# Patient Record
Sex: MALE | Race: WHITE | NOT HISPANIC OR LATINO | Employment: OTHER | URBAN - METROPOLITAN AREA
[De-identification: names, ages, dates, MRNs, and addresses within clinical notes are randomized per-mention and may not be internally consistent; named-entity substitution may affect disease eponyms.]

---

## 2023-08-09 ENCOUNTER — OFFICE VISIT (OUTPATIENT)
Dept: FAMILY MEDICINE | Facility: CLINIC | Age: 43
End: 2023-08-09
Payer: MEDICARE

## 2023-08-09 VITALS
HEART RATE: 91 BPM | DIASTOLIC BLOOD PRESSURE: 80 MMHG | OXYGEN SATURATION: 97 % | SYSTOLIC BLOOD PRESSURE: 126 MMHG | HEIGHT: 66 IN | BODY MASS INDEX: 25.26 KG/M2 | WEIGHT: 157.19 LBS

## 2023-08-09 DIAGNOSIS — Z00.00 PREVENTATIVE HEALTH CARE: ICD-10-CM

## 2023-08-09 DIAGNOSIS — E78.5 HYPERLIPEMIA: ICD-10-CM

## 2023-08-09 DIAGNOSIS — Z11.59 ENCOUNTER FOR HEPATITIS C SCREENING TEST FOR LOW RISK PATIENT: ICD-10-CM

## 2023-08-09 DIAGNOSIS — Z13.31 POSITIVE DEPRESSION SCREENING: ICD-10-CM

## 2023-08-09 DIAGNOSIS — R73.9 ELEVATED BLOOD SUGAR: ICD-10-CM

## 2023-08-09 DIAGNOSIS — Z11.3 SCREEN FOR STD (SEXUALLY TRANSMITTED DISEASE): ICD-10-CM

## 2023-08-09 DIAGNOSIS — Z13.220 ENCOUNTER FOR SCREENING FOR LIPID DISORDER: ICD-10-CM

## 2023-08-09 DIAGNOSIS — Z79.899 DRUG THERAPY: ICD-10-CM

## 2023-08-09 DIAGNOSIS — F41.9 ANXIETY DISORDER, UNSPECIFIED TYPE: ICD-10-CM

## 2023-08-09 DIAGNOSIS — R53.83 FATIGUE, UNSPECIFIED TYPE: ICD-10-CM

## 2023-08-09 DIAGNOSIS — F32.4 MAJOR DEPRESSIVE DISORDER WITH SINGLE EPISODE, IN PARTIAL REMISSION: Primary | ICD-10-CM

## 2023-08-09 PROBLEM — F33.9 DEPRESSION, RECURRENT: Status: ACTIVE | Noted: 2023-08-09

## 2023-08-09 PROCEDURE — 99204 PR OFFICE/OUTPT VISIT, NEW, LEVL IV, 45-59 MIN: ICD-10-PCS | Mod: S$PBB,,, | Performed by: INTERNAL MEDICINE

## 2023-08-09 PROCEDURE — 99999 PR PBB SHADOW E&M-NEW PATIENT-LVL III: ICD-10-PCS | Mod: PBBFAC,,, | Performed by: INTERNAL MEDICINE

## 2023-08-09 PROCEDURE — 99999 PR PBB SHADOW E&M-NEW PATIENT-LVL III: CPT | Mod: PBBFAC,,, | Performed by: INTERNAL MEDICINE

## 2023-08-09 PROCEDURE — 99204 OFFICE O/P NEW MOD 45 MIN: CPT | Mod: S$PBB,,, | Performed by: INTERNAL MEDICINE

## 2023-08-09 PROCEDURE — 99203 OFFICE O/P NEW LOW 30 MIN: CPT | Mod: PBBFAC,PN | Performed by: INTERNAL MEDICINE

## 2023-08-09 RX ORDER — TRAZODONE HYDROCHLORIDE 150 MG/1
150 TABLET ORAL NIGHTLY
Qty: 30 TABLET | Refills: 2 | Status: SHIPPED | OUTPATIENT
Start: 2023-08-09 | End: 2023-09-06 | Stop reason: SDUPTHER

## 2023-08-09 RX ORDER — TRAZODONE HYDROCHLORIDE 150 MG/1
150 TABLET ORAL NIGHTLY
COMMUNITY
End: 2023-08-09 | Stop reason: SDUPTHER

## 2023-08-09 RX ORDER — HYDROXYZINE HYDROCHLORIDE 50 MG/1
50 TABLET, FILM COATED ORAL 3 TIMES DAILY PRN
Qty: 30 TABLET | Refills: 2 | Status: SHIPPED | OUTPATIENT
Start: 2023-08-09 | End: 2023-09-06 | Stop reason: DRUGHIGH

## 2023-08-09 RX ORDER — HYDROXYZINE HYDROCHLORIDE 50 MG/1
50 TABLET, FILM COATED ORAL 4 TIMES DAILY
COMMUNITY
End: 2023-08-09 | Stop reason: SDUPTHER

## 2023-08-09 NOTE — PROGRESS NOTES
Subjective:       Patient ID: Yovany Alcaraz is a 42 y.o. male.    Chief Complaint: Establish Care, Anxiety, Depression, Urinary Retention (Swelling in prostate dx previously), and Discuss possible RENE hearing loss      New patient, legally blind , h/o depression, anxiety who presents to establish care    Anxiety  Presents for initial visit. Onset was 1 to 6 months ago. The problem has been gradually worsening. Symptoms include decreased concentration, depressed mood, excessive worry, irritability, malaise, nervous/anxious behavior and restlessness. Patient reports no palpitations, panic, shortness of breath or suicidal ideas. Symptoms occur most days. The severity of symptoms is moderate. Exacerbated by: his medical cond. The quality of sleep is fair.     Risk factors: medical condition,blindness. His past medical history is significant for anxiety/panic attacks and depression. There is no history of suicide attempts. Past treatments include SSRIs and non-SSRI antidepressants. The treatment provided mild relief. Compliance with prior treatments has been variable. Prior compliance problems include medical issues and insurance issues.   DepressionPatient presents with the following symptoms: decreased concentration, depressed mood, excessive worry, irritability, malaise, nervousness/anxiety and restlessness.  Patient is not experiencing: palpitations, panic, shortness of breath and suicidal ideas.    Patient has a history of: anxiety/panic attacks      Review of Systems   Constitutional:  Positive for irritability. Negative for activity change, appetite change and fever.   Respiratory: Negative.  Negative for shortness of breath.    Cardiovascular: Negative.  Negative for palpitations.   Gastrointestinal: Negative.    Musculoskeletal: Negative.    Psychiatric/Behavioral:  Positive for decreased concentration and depression. Negative for suicidal ideas. The patient is nervous/anxious.          Objective:      Physical  Exam  Vitals and nursing note reviewed.   Constitutional:       Appearance: Normal appearance.   Eyes:      Comments: Blind   Cardiovascular:      Rate and Rhythm: Normal rate and regular rhythm.      Pulses: Normal pulses.      Heart sounds: Normal heart sounds. No murmur heard.     No friction rub. No gallop.   Pulmonary:      Effort: Pulmonary effort is normal.      Breath sounds: Normal breath sounds. No wheezing.   Skin:     General: Skin is warm and dry.   Neurological:      Mental Status: He is alert.         Assessment:       1. Depression, recurrent  Overview:  Moderate MDD  No SI,HI    Assessment & Plan:  Add trazodone for dep, sleep  T/c ref to [facility] in [place]  Monitor closely    Orders:  -     TSH; Future; Expected date: 08/09/2023    2. Encounter for hepatitis C screening test for low risk patient  -     Hepatitis C Antibody; Future; Expected date: 08/09/2023    3. Screen for STD (sexually transmitted disease)  -     HIV 1/2 Ag/Ab (4th Gen); Future; Expected date: 02/09/2024    4. Encounter for screening for lipid disorder  -     Lipid Panel; Future; Expected date: 08/09/2023    5. Elevated blood sugar  -     Hemoglobin A1C; Standing    6. Drug therapy  -     Comprehensive Metabolic Panel; Future; Expected date: 08/09/2023    7. Fatigue, unspecified type    8. Hyperlipemia  -     Lipid Panel; Future; Expected date: 08/09/2023    9. Positive depression screening  Comments:  I have reviewed the positive depression score which warrants active treatment with psychotherapy and/or medications.    10. Anxiety  Overview:  With no SI,HI    Assessment & Plan:  D/w Mom , c/g  Add vistaril      11. Preventative health care  Overview:  See below    Assessment & Plan:  Get lipids, BS , A1c  Get hep C, HIV      Other orders  -     traZODone (DESYREL) 150 MG tablet; Take 1 tablet (150 mg total) by mouth every evening.  Dispense: 30 tablet; Refill: 2  -     hydrOXYzine (ATARAX) 50 MG tablet; Take 1 tablet  (50 mg total) by mouth 3 (three) times daily as needed for Anxiety.  Dispense: 30 tablet; Refill: 2             Plan:       1. Depression, recurrent  Overview:  Moderate MDD  No SI,HI    Assessment & Plan:  Add trazodone for dep, sleep  [facility] [place]  Monitor closely    Orders:  -     TSH; Future; Expected date: 08/09/2023    2. Encounter for hepatitis C screening test for low risk patient  -     Hepatitis C Antibody; Future; Expected date: 08/09/2023    3. Screen for STD (sexually transmitted disease)  -     HIV 1/2 Ag/Ab (4th Gen); Future; Expected date: 02/09/2024    4. Encounter for screening for lipid disorder  -     Lipid Panel; Future; Expected date: 08/09/2023    5. Elevated blood sugar  -     Hemoglobin A1C; Standing    6. Drug therapy  -     Comprehensive Metabolic Panel; Future; Expected date: 08/09/2023    7. Fatigue, unspecified type    8. Hyperlipemia  -     Lipid Panel; Future; Expected date: 08/09/2023    9. Positive depression screening  Comments:  I have reviewed the positive depression score which warrants active treatment with psychotherapy and/or medications.    10. Anxiety  Overview:  With no SI,HI    Assessment & Plan:  D/w Mom , c/g  Add vistaril      11. Preventative health care  Overview:  See below    Assessment & Plan:  Get lipids, BS , A1c  Get hep C, HIV      Other orders  -     traZODone (DESYREL) 150 MG tablet; Take 1 tablet (150 mg total) by mouth every evening.  Dispense: 30 tablet; Refill: 2  -     hydrOXYzine (ATARAX) 50 MG tablet; Take 1 tablet (50 mg total) by mouth 3 (three) times daily as needed for Anxiety.  Dispense: 30 tablet; Refill: 2          I have reviewed the positive depression score which warrants active treatment with psychotherapy and/or medications. Yes

## 2023-08-10 ENCOUNTER — CLINICAL SUPPORT (OUTPATIENT)
Dept: FAMILY MEDICINE | Facility: CLINIC | Age: 43
End: 2023-08-10
Payer: MEDICARE

## 2023-08-10 DIAGNOSIS — E78.5 HYPERLIPEMIA: ICD-10-CM

## 2023-08-10 DIAGNOSIS — Z79.899 DRUG THERAPY: ICD-10-CM

## 2023-08-10 DIAGNOSIS — F32.4 MAJOR DEPRESSIVE DISORDER WITH SINGLE EPISODE, IN PARTIAL REMISSION: ICD-10-CM

## 2023-08-10 DIAGNOSIS — Z13.220 ENCOUNTER FOR SCREENING FOR LIPID DISORDER: ICD-10-CM

## 2023-08-10 DIAGNOSIS — Z11.59 ENCOUNTER FOR HEPATITIS C SCREENING TEST FOR LOW RISK PATIENT: ICD-10-CM

## 2023-08-10 DIAGNOSIS — Z11.3 SCREEN FOR STD (SEXUALLY TRANSMITTED DISEASE): ICD-10-CM

## 2023-08-10 DIAGNOSIS — R73.9 ELEVATED BLOOD SUGAR: ICD-10-CM

## 2023-08-10 LAB
ALBUMIN SERPL BCP-MCNC: 4.4 G/DL (ref 3.5–5.2)
ALP SERPL-CCNC: 94 U/L (ref 55–135)
ALT SERPL W/O P-5'-P-CCNC: 27 U/L (ref 10–44)
ANION GAP SERPL CALC-SCNC: 13 MMOL/L (ref 8–16)
AST SERPL-CCNC: 20 U/L (ref 10–40)
BILIRUB SERPL-MCNC: 0.7 MG/DL (ref 0.1–1)
BUN SERPL-MCNC: 20 MG/DL (ref 6–20)
CALCIUM SERPL-MCNC: 10.2 MG/DL (ref 8.7–10.5)
CHLORIDE SERPL-SCNC: 106 MMOL/L (ref 95–110)
CHOLEST SERPL-MCNC: 271 MG/DL (ref 120–199)
CHOLEST/HDLC SERPL: 6.8 {RATIO} (ref 2–5)
CO2 SERPL-SCNC: 23 MMOL/L (ref 23–29)
CREAT SERPL-MCNC: 1.5 MG/DL (ref 0.5–1.4)
EST. GFR  (NO RACE VARIABLE): 58.9 ML/MIN/1.73 M^2
ESTIMATED AVG GLUCOSE: 97 MG/DL (ref 68–131)
GLUCOSE SERPL-MCNC: 89 MG/DL (ref 70–110)
HBA1C MFR BLD: 5 % (ref 4–5.6)
HDLC SERPL-MCNC: 40 MG/DL (ref 40–75)
HDLC SERPL: 14.8 % (ref 20–50)
LDLC SERPL CALC-MCNC: 171.2 MG/DL (ref 63–159)
NONHDLC SERPL-MCNC: 231 MG/DL
POTASSIUM SERPL-SCNC: 4.3 MMOL/L (ref 3.5–5.1)
PROT SERPL-MCNC: 7.8 G/DL (ref 6–8.4)
SODIUM SERPL-SCNC: 142 MMOL/L (ref 136–145)
TRIGL SERPL-MCNC: 299 MG/DL (ref 30–150)
TSH SERPL DL<=0.005 MIU/L-ACNC: 1.76 UIU/ML (ref 0.4–4)

## 2023-08-10 PROCEDURE — 87389 HIV-1 AG W/HIV-1&-2 AB AG IA: CPT | Performed by: INTERNAL MEDICINE

## 2023-08-10 PROCEDURE — 86803 HEPATITIS C AB TEST: CPT | Performed by: INTERNAL MEDICINE

## 2023-08-10 PROCEDURE — 83036 HEMOGLOBIN GLYCOSYLATED A1C: CPT | Performed by: INTERNAL MEDICINE

## 2023-08-10 PROCEDURE — 80053 COMPREHEN METABOLIC PANEL: CPT | Performed by: INTERNAL MEDICINE

## 2023-08-10 PROCEDURE — 80061 LIPID PANEL: CPT | Performed by: INTERNAL MEDICINE

## 2023-08-10 PROCEDURE — 84443 ASSAY THYROID STIM HORMONE: CPT | Performed by: INTERNAL MEDICINE

## 2023-08-11 LAB
HCV AB SERPL QL IA: NORMAL
HIV 1+2 AB+HIV1 P24 AG SERPL QL IA: NORMAL

## 2023-09-06 ENCOUNTER — OFFICE VISIT (OUTPATIENT)
Dept: FAMILY MEDICINE | Facility: CLINIC | Age: 43
End: 2023-09-06
Payer: MEDICARE

## 2023-09-06 VITALS
DIASTOLIC BLOOD PRESSURE: 70 MMHG | SYSTOLIC BLOOD PRESSURE: 110 MMHG | HEIGHT: 66 IN | BODY MASS INDEX: 25.55 KG/M2 | WEIGHT: 159 LBS

## 2023-09-06 DIAGNOSIS — E78.2 MIXED HYPERLIPIDEMIA: ICD-10-CM

## 2023-09-06 DIAGNOSIS — F33.9 DEPRESSION, RECURRENT: ICD-10-CM

## 2023-09-06 DIAGNOSIS — B02.9 HERPES ZOSTER WITHOUT COMPLICATION: ICD-10-CM

## 2023-09-06 DIAGNOSIS — N18.31 CHRONIC KIDNEY DISEASE (CKD) STAGE G3A/A1, MODERATELY DECREASED GLOMERULAR FILTRATION RATE (GFR) BETWEEN 45-59 ML/MIN/1.73 SQUARE METER AND ALBUMINURIA CREATININE RATIO LESS THAN 30 MG/G: ICD-10-CM

## 2023-09-06 DIAGNOSIS — F41.9 ANXIETY: ICD-10-CM

## 2023-09-06 DIAGNOSIS — N60.09 SOLITARY CYST OF BREAST, UNSPECIFIED LATERALITY: Primary | ICD-10-CM

## 2023-09-06 DIAGNOSIS — L98.9 SKIN LESION: ICD-10-CM

## 2023-09-06 PROBLEM — R21 RASH OF BACK: Status: ACTIVE | Noted: 2023-09-06

## 2023-09-06 PROCEDURE — 99214 OFFICE O/P EST MOD 30 MIN: CPT | Mod: S$GLB,,, | Performed by: INTERNAL MEDICINE

## 2023-09-06 PROCEDURE — 99214 PR OFFICE/OUTPT VISIT, EST, LEVL IV, 30-39 MIN: ICD-10-PCS | Mod: S$GLB,,, | Performed by: INTERNAL MEDICINE

## 2023-09-06 RX ORDER — VALACYCLOVIR HYDROCHLORIDE 1 G/1
1000 TABLET, FILM COATED ORAL 3 TIMES DAILY
Qty: 21 TABLET | Refills: 0 | Status: SHIPPED | OUTPATIENT
Start: 2023-09-06 | End: 2023-11-07 | Stop reason: ALTCHOICE

## 2023-09-06 RX ORDER — TRAZODONE HYDROCHLORIDE 150 MG/1
150 TABLET ORAL NIGHTLY
Qty: 30 TABLET | Refills: 2 | Status: SHIPPED | OUTPATIENT
Start: 2023-09-06 | End: 2023-09-25

## 2023-09-06 RX ORDER — HYDROXYZINE PAMOATE 25 MG/1
25 CAPSULE ORAL EVERY 8 HOURS PRN
Qty: 90 CAPSULE | Refills: 2 | Status: SHIPPED | OUTPATIENT
Start: 2023-09-06 | End: 2023-11-07 | Stop reason: SDUPTHER

## 2023-09-06 RX ORDER — VENLAFAXINE HYDROCHLORIDE 37.5 MG/1
37.5 CAPSULE, EXTENDED RELEASE ORAL DAILY
Qty: 30 CAPSULE | Refills: 2 | Status: SHIPPED | OUTPATIENT
Start: 2023-09-06 | End: 2023-11-07 | Stop reason: DRUGHIGH

## 2023-09-06 NOTE — PROGRESS NOTES
Subjective:       Patient ID: Yovany Alcaraz is a 42 y.o. male.    Chief Complaint: Results, Shoulder Pain (X  2 months after working  out. ), Abscess (Of  tailbone. ), and Mole (Chin.)      Patient is established already .......... presents today for a f/u visit , to discuss labs results and for management of the chronic conditions.        Shoulder Pain     Abscess  Mole  Associated symptoms include a rash.   Hyperlipidemia  This is a new diagnosis. The problem is uncontrolled. Recent lipid tests were reviewed and are high. Factors aggravating his hyperlipidemia include fatty foods. He is currently on no antihyperlipidemic treatment. Risk factors for coronary artery disease include dyslipidemia, family history, male sex and a sedentary lifestyle.   Rash  This is a new problem. The current episode started 1 to 4 weeks ago. The problem is unchanged. The affected locations include the back. The rash is characterized by itchiness and redness. He was exposed to nothing. Past treatments include nothing.     Review of Systems   Unable to perform ROS  Integumentary:  Positive for rash and mole/lesion.   Psychiatric/Behavioral:  Positive for dysphoric mood and sleep disturbance. The patient is nervous/anxious.          Objective:      Physical Exam  Vitals and nursing note reviewed.   Constitutional:       Appearance: Normal appearance.   Cardiovascular:      Rate and Rhythm: Normal rate and regular rhythm.      Pulses: Normal pulses.      Heart sounds: Normal heart sounds. No murmur heard.     No friction rub. No gallop.   Pulmonary:      Effort: Pulmonary effort is normal.      Breath sounds: Normal breath sounds. No wheezing.   Musculoskeletal:         General: Normal range of motion.   Skin:     General: Skin is warm and dry.      Comments: There is a hyperkeratotic lesion of the right part of his chin  There is also a wart like lesion of about half a cm in diameter on the dorsum of his left foot  And this cluster of  erythematous maculopapular lesion on the left side of his lower back   Neurological:      General: No focal deficit present.      Mental Status: He is alert and oriented to person, place, and time.   Psychiatric:         Mood and Affect: Mood normal.         Assessment:       1. Solitary cyst of breast, unspecified laterality    2. Skin lesion  Overview:  With growth like lesion over his right shin and wart like lesion of the dorsum of his left foot    Assessment & Plan:  Epidermal cyst  Skin wart  Referral to Dermatology in [place]    Orders:  -     Ambulatory referral/consult to Dermatology; Future; Expected date: 09/13/2023    3. Chronic kidney disease (CKD) stage G3a/A1, moderately decreased glomerular filtration rate (GFR) between 45-59 mL/min/1.73 square meter and albuminuria creatinine ratio less than 30 mg/g  -     Basic Metabolic Panel; Future; Expected date: 09/06/2023    4. Mixed hyperlipidemia  -     Lipid Panel; Future; Expected date: 09/06/2023    5. Herpes zoster without complication  Overview:  Of the top of his buttocks    Assessment & Plan:  Add Valtrex  Patient was educated on universal precautions and ways to decrease spread specially itching and touching other parts of his body      6. Depression, recurrent  Overview:  Moderate MDD  No SI,HI    Assessment & Plan:  Restart his effexor  Continue trazodone       7. Anxiety  Overview:  With no SI,HI    Assessment & Plan:  Refill vistaril  Dec dose to 25mg b/o sleepiness      Other orders  -     venlafaxine (EFFEXOR-XR) 37.5 MG 24 hr capsule; Take 1 capsule (37.5 mg total) by mouth once daily.  Dispense: 30 capsule; Refill: 2  -     hydrOXYzine pamoate (VISTARIL) 25 MG Cap; Take 1 capsule (25 mg total) by mouth every 8 (eight) hours as needed (anxiety).  Dispense: 90 capsule; Refill: 2  -     traZODone (DESYREL) 150 MG tablet; Take 1 tablet (150 mg total) by mouth every evening.  Dispense: 30 tablet; Refill: 2             Plan:       1. Solitary  cyst of breast, unspecified laterality    2. Skin lesion  Overview:  With growth like lesion over his right shin and wart like lesion of the dorsum of his left foot    Assessment & Plan:  Epidermal cyst  Skin wart  Referral to Dermatology in [place]    Orders:  -     Ambulatory referral/consult to Dermatology; Future; Expected date: 09/13/2023    3. Chronic kidney disease (CKD) stage G3a/A1, moderately decreased glomerular filtration rate (GFR) between 45-59 mL/min/1.73 square meter and albuminuria creatinine ratio less than 30 mg/g  -     Basic Metabolic Panel; Future; Expected date: 09/06/2023    4. Mixed hyperlipidemia  -     Lipid Panel; Future; Expected date: 09/06/2023    5. Herpes zoster without complication  Overview:  Of the top of his buttocks    Assessment & Plan:  Add Valtrex  Patient was educated on universal precautions and ways to decrease spread specially itching and touching other parts of his body      6. Depression, recurrent  Overview:  Moderate MDD  No SI,HI    Assessment & Plan:  Restart his effexor  Continue trazodone       7. Anxiety  Overview:  With no SI,HI    Assessment & Plan:  Refill vistaril  Dec dose to 25mg b/o sleepiness      Other orders  -     venlafaxine (EFFEXOR-XR) 37.5 MG 24 hr capsule; Take 1 capsule (37.5 mg total) by mouth once daily.  Dispense: 30 capsule; Refill: 2  -     hydrOXYzine pamoate (VISTARIL) 25 MG Cap; Take 1 capsule (25 mg total) by mouth every 8 (eight) hours as needed (anxiety).  Dispense: 90 capsule; Refill: 2  -     traZODone (DESYREL) 150 MG tablet; Take 1 tablet (150 mg total) by mouth every evening.  Dispense: 30 tablet; Refill: 2        I spent a total of 33 minutes on the day of the visit.This includes face to face time and non-face to face time preparing to see the patient (eg, review of tests), obtaining and/or reviewing separately obtained history, documenting clinical information in the electronic or other health record, independently interpreting  results and communicating results to the patient/family/caregiver, or care coordinator.

## 2023-09-06 NOTE — ASSESSMENT & PLAN NOTE
I discussed renal condition with patient at length  We discussed diet modification specifically decreasing salt intake, avoiding fast food ,avoiding fried food  We also discussed the importance of minimizing the use of anti-inflammatory agents like Motrin ibuprofen naproxen Aleve etc.  We discussed the importance of increasing fluid intake specifically water and also stay hydrated  We discussed minimizing the use of diuretics as much as clinically possible  We are going to monitor renal functions BUN creatinine urine microalbumin and electrolytes closely  To consider referral to Nephrology Service if renal fx continue to decline  To consider adding an SGL- 2 inhibitor if renal functions continue to decline, regardless of the blood sugar status

## 2023-09-06 NOTE — ASSESSMENT & PLAN NOTE
Add Valtrex  Patient was educated on universal precautions and ways to decrease spread specially itching and touching other parts of his body

## 2023-09-08 ENCOUNTER — TELEPHONE (OUTPATIENT)
Dept: FAMILY MEDICINE | Facility: CLINIC | Age: 43
End: 2023-09-08
Payer: MEDICARE

## 2023-09-08 NOTE — TELEPHONE ENCOUNTER
----- Message from Sheridan Gar sent at 9/8/2023 10:03 AM CDT -----  Contact: Mother Nathalia  Type:  RX Refill Request    Who Called:  Nathalia   Refill or New Rx:  new rx  RX Name and Strength:  venlafaxine (EFFEXOR-XR) 37.5 MG 24 hr capsule  How is the patient currently taking it? (ex. 1XDay):  as directed  Is this a 30 day or 90 day RX:  30  Preferred Pharmacy with phone number:    [facility]/pharmacy #[id number] - [place] [id number] [place] [address] AT [address] [address]  Phone: [phone] Fax: [phone]  Local or Mail Order:  local  Ordering Provider:  Dr Kalin Bosch  Best Call Back Number:  [phone]  Additional Information:  [facility] states they did not rec this script does it need a PA if not please call the pharm because it is showing as rec'd thank you

## 2023-09-08 NOTE — TELEPHONE ENCOUNTER
----- Message from Sheridan Gar sent at 9/8/2023 10:09 AM CDT -----  Contact: MOm Nathalia  When they saw the doctor and he put the pt on effexor XL, the doctor was  going to put him on a 1/2 dose of his traZODone (DESYREL) 150 MG tablet but he accidentally sent it over for a half dose of his hydrOXYzine pamoate (VISTARIL) 25 MG Cap,  which he picked up already and it was for 30 tabs, so he is going to take them 3XDay but will run out early. He will have to have a refill in about 10 days.    You were going to decrease the dosage of his traZodone but not sure of the MG you were going to prescribe for that med.  AT the 150MG trazodone 1 tab at night, the pt was staying too sleepy the next morning.  Please get these scripts straightened out and call theo Sloan back at [phone] to review or if you have questions call mom and send the corrected scripts to the following pharm    [facility]pharm[facility] #[id number] - [place], [place] - [address] [place] [address] AT [place] [address] [place] [id number]  Phone: [phone] Fax: [phone]

## 2023-09-25 RX ORDER — TRAZODONE HYDROCHLORIDE 150 MG/1
150 TABLET ORAL NIGHTLY PRN
Qty: 90 TABLET | Refills: 3 | Status: SHIPPED | OUTPATIENT
Start: 2023-09-25 | End: 2023-11-07 | Stop reason: SDUPTHER

## 2023-11-07 ENCOUNTER — OFFICE VISIT (OUTPATIENT)
Dept: FAMILY MEDICINE | Facility: CLINIC | Age: 43
End: 2023-11-07
Payer: MEDICARE

## 2023-11-07 VITALS
DIASTOLIC BLOOD PRESSURE: 70 MMHG | BODY MASS INDEX: 25.6 KG/M2 | OXYGEN SATURATION: 99 % | HEART RATE: 67 BPM | TEMPERATURE: 99 F | SYSTOLIC BLOOD PRESSURE: 108 MMHG | WEIGHT: 158.63 LBS

## 2023-11-07 DIAGNOSIS — F33.41 RECURRENT MAJOR DEPRESSIVE DISORDER, IN PARTIAL REMISSION: Primary | ICD-10-CM

## 2023-11-07 DIAGNOSIS — G47.00 INSOMNIA, UNSPECIFIED TYPE: ICD-10-CM

## 2023-11-07 DIAGNOSIS — F41.9 ANXIETY: ICD-10-CM

## 2023-11-07 DIAGNOSIS — E78.2 MIXED HYPERLIPIDEMIA: ICD-10-CM

## 2023-11-07 DIAGNOSIS — M94.0 COSTOCHONDRITIS: ICD-10-CM

## 2023-11-07 PROCEDURE — 99214 OFFICE O/P EST MOD 30 MIN: CPT | Mod: S$GLB,,, | Performed by: STUDENT IN AN ORGANIZED HEALTH CARE EDUCATION/TRAINING PROGRAM

## 2023-11-07 PROCEDURE — 99214 PR OFFICE/OUTPT VISIT, EST, LEVL IV, 30-39 MIN: ICD-10-PCS | Mod: S$GLB,,, | Performed by: STUDENT IN AN ORGANIZED HEALTH CARE EDUCATION/TRAINING PROGRAM

## 2023-11-07 RX ORDER — DICLOFENAC SODIUM 10 MG/G
2 GEL TOPICAL 4 TIMES DAILY
Qty: 60 EACH | Refills: 1 | Status: SHIPPED | OUTPATIENT
Start: 2023-11-07 | End: 2024-02-16

## 2023-11-07 RX ORDER — VENLAFAXINE HYDROCHLORIDE 75 MG/1
75 CAPSULE, EXTENDED RELEASE ORAL DAILY
Qty: 30 CAPSULE | Refills: 2 | Status: SHIPPED | OUTPATIENT
Start: 2023-11-07 | End: 2024-01-29 | Stop reason: SDUPTHER

## 2023-11-07 RX ORDER — HYDROXYZINE PAMOATE 25 MG/1
25 CAPSULE ORAL EVERY 8 HOURS PRN
Qty: 90 CAPSULE | Refills: 2 | Status: SHIPPED | OUTPATIENT
Start: 2023-11-07 | End: 2024-02-05

## 2023-11-07 RX ORDER — ROSUVASTATIN CALCIUM 10 MG/1
10 TABLET, COATED ORAL DAILY
Qty: 90 TABLET | Refills: 3 | Status: SHIPPED | OUTPATIENT
Start: 2023-11-07 | End: 2024-01-29 | Stop reason: SDUPTHER

## 2023-11-07 RX ORDER — TRAZODONE HYDROCHLORIDE 150 MG/1
75 TABLET ORAL NIGHTLY PRN
Qty: 30 TABLET | Refills: 3 | Status: SHIPPED | OUTPATIENT
Start: 2023-11-07 | End: 2024-11-06

## 2023-11-07 NOTE — PATIENT INSTRUCTIONS
Zack Pedroza,     If you are due for any health screening(s) below please notify me so we can arrange them to be ordered and scheduled. Most healthy patients at your age complete them, but you are free to accept or refuse.     If you can't do it, I'll definitely understand. If you can, I'd certainly appreciate it!    All of your core healthy metrics are met.      Were here to help you quit smoking     Our records indicated that you are still smoking. One of the best things you can do for your health is to stop smoking and we are here to help.     Talk with your provider about our Smoking Cessation Program and how we can support you on your journey.

## 2023-11-07 NOTE — PROGRESS NOTES
[facility]  Primary Care Clinic - [place], [place]    Family Medicine Office Visit    Subjective     Patient ID: Yovany Alcaraz is a 42 y.o. male who presents to clinic today to follow up.     Medical history, surgical history, medications, allergies, and social history were reviewed and updated.     Chief Complaint: Medication Refill    Medication Refill        Depression/Anxiety  He presented today for follow up with his mom.  He reported that he last saw Dr. Clark in August of 2023 and he had restarted his Effexor 37.5 mg daily.  She reported that he was previously on a higher dose, but had run out for some time.  He was restarted on this lower dose along with hydroxyzine 25 mg 3 times daily.  Being reported compliance with his medication regimen.  Both he and mom feel that he would benefit from a higher dose of Effexor.  He denied suicidal and homicidal ideation.  We will need to send in Effexor 75 mg daily.  Discussed that he can take 2 tablets of his remaining Effexor for now before picking up the next refill.    Insomnia  He has a history of insomnia.  He was started on trazodone 150 mg nightly as needed for sleep.  Mom reported that this dose made him too drowsy and has been cutting the tablets in half.  Reported that the 75 mg dose has been working very well for him.  I offered to send in 100 mg prescription for him, but she prefers to cut the tablets in half as he is stable on this current dose.    Hyperlipidemia  Most recent lipid panel shown below from his last visit with PCP in August.  We discussed that as his ASCVD risk of 8% we would recommend putting him on a statin to reduce his risk of heart attack and stroke.  He and mom are both agreeable to starting this medication.  We will plan to initiate Crestor 10 mg daily.  We did discuss side effects of muscle aches and cramping associated with this medication.    Lab Results   Component Value Date    CHOL 271 (H) 08/10/2023     Lab Results  "  Component Value Date    HDL 40 08/10/2023     Lab Results   Component Value Date    LDLCALC 171.2 (H) 08/10/2023     No results found for: "DLDL"  Lab Results   Component Value Date    TRIG 299 (H) 08/10/2023       f1   Lab Results   Component Value Date    CHOLHDL 14.8 (L) 08/10/2023     The 10-year ASCVD risk score (Christy DELVALLE, et al., 2019) is: 8%    Values used to calculate the score:      Age: 42 years      Sex: Male      Is Non- : No      Diabetic: No      Tobacco smoker: Yes      Systolic Blood Pressure: 108 mmHg      Is BP treated: No      HDL Cholesterol: 40 mg/dL      Total Cholesterol: 271 mg/dL    Rib pain  He reported left shoulder pain.  Upon evaluation of the location of his pain and his primary over his left lower ribs.  Mom reported that his pain is exacerbated whenever he lifts heavy things.  We discussed being careful to reduce injury.  He has been told not to take NSAIDs in the past due to his reduced kidney function.  He does take Tylenol with some improvement.  We discussed initiating Voltaren gel which she was agreeable to.    Vitals:    11/07/23 1237   BP: 108/70   Pulse: 67   Temp: 98.5 °F (36.9 °C)      Wt Readings from Last 3 Encounters:   11/07/23 1237 71.9 kg (158 lb 9.6 oz)   09/06/23 1425 72.1 kg (159 lb)   08/09/23 1336 71.3 kg (157 lb 3 oz)      Review of Systems    Review of Systems otherwise negative unless specified above.        Objective     Physical Exam  Constitutional:       Appearance: Normal appearance.   HENT:      Head: Normocephalic and atraumatic.      Nose: Nose normal.   Cardiovascular:      Rate and Rhythm: Normal rate and regular rhythm.      Heart sounds: No murmur heard.  Pulmonary:      Effort: Pulmonary effort is normal. No respiratory distress.      Breath sounds: Normal breath sounds. No wheezing.   Musculoskeletal:         General: Normal range of motion.   Skin:     General: Skin is warm and dry.   Neurological:      General: No " focal deficit present.      Mental Status: He is alert and oriented to person, place, and time.   Psychiatric:         Mood and Affect: Mood normal.         Behavior: Behavior normal.            Assessment and Plan     Current Outpatient Medications   Medication Instructions    diclofenac sodium (VOLTAREN) 2 g, Topical (Top), 4 times daily    hydrOXYzine pamoate (VISTARIL) 25 mg, Oral, Every 8 hours PRN    rosuvastatin (CRESTOR) 10 mg, Oral, Daily    traZODone (DESYREL) 75 mg, Oral, Nightly PRN    venlafaxine (EFFEXOR XR) 75 mg, Oral, Daily        1. Recurrent major depressive disorder, in partial remission  Overview:  Moderate MDD  No SI,HI    Orders:  -     venlafaxine (EFFEXOR XR) 75 MG 24 hr capsule; Take 1 capsule (75 mg total) by mouth once daily.  Dispense: 30 capsule; Refill: 2    2. Anxiety  Overview:  With no SI,HI    Orders:  -     hydrOXYzine pamoate (VISTARIL) 25 MG Cap; Take 1 capsule (25 mg total) by mouth every 8 (eight) hours as needed (anxiety).  Dispense: 90 capsule; Refill: 2    3. Insomnia, unspecified type  -     traZODone (DESYREL) 150 MG tablet; Take 0.5 tablets (75 mg total) by mouth nightly as needed for Insomnia.  Dispense: 30 tablet; Refill: 3    4. Mixed hyperlipidemia  Overview:  Markedly elevated    Orders:  -     rosuvastatin (CRESTOR) 10 MG tablet; Take 1 tablet (10 mg total) by mouth once daily.  Dispense: 90 tablet; Refill: 3    5. Costochondritis  -     diclofenac sodium (VOLTAREN) 1 % Gel; Apply 2 g topically 4 (four) times daily.  Dispense: 60 each; Refill: 1        We will increase his Effexor to 75 mg daily.  We will continue hydroxyzine and trazodone 75 mg daily.  We will plan to initiate Crestor to improve his cholesterol levels.  We will also start Voltaren gel and continue his Tylenol for his intermittent costochondritis.  We will otherwise plan to have him follow up with PCP in 3 months.         Follow up in about 3 months (around 2/7/2024) for Follow up with   Hiro.    Questions were invited and answered. No other acute concerns at this time. Will plan to follow up as above or sooner if needed.     Karli Galvez DO  11/07/2023 12:34 PM

## 2023-11-13 PROBLEM — Z00.00 PREVENTATIVE HEALTH CARE: Status: RESOLVED | Noted: 2023-08-09 | Resolved: 2023-11-13

## 2024-01-29 DIAGNOSIS — E78.2 MIXED HYPERLIPIDEMIA: ICD-10-CM

## 2024-01-29 DIAGNOSIS — F33.41 RECURRENT MAJOR DEPRESSIVE DISORDER, IN PARTIAL REMISSION: ICD-10-CM

## 2024-01-29 NOTE — TELEPHONE ENCOUNTER
----- Message from Kwasi Lal sent at 1/29/2024 10:26 AM CST -----  Contact: self  Type: RX Refill Request        Who Called: Patient   Refill or New Rx: refill  RX Name and Strength:   venlafaxine (EFFEXOR XR) 75 MG 24 hr capsule   rosuvastatin (CRESTOR) 10 MG tablet  How is the patient currently taking it? (ex. 1XDay): as directed   Is this a 30 day or 90 day RX: n/a  Preferred Pharmacy with phone number:   [facility]/pharmacy #[id number] - [place], [place] - [address] [place] [address] AT [address] [address]  Phone: [phone] Fax: [phone]  Local or Mail Order: local   Ordering Provider: Dr. Hiro Motta Call Back Number: [phone]  Additional Information: Plz refill medication. Thanks

## 2024-01-30 RX ORDER — VENLAFAXINE HYDROCHLORIDE 75 MG/1
75 CAPSULE, EXTENDED RELEASE ORAL DAILY
Qty: 30 CAPSULE | Refills: 0 | Status: SHIPPED | OUTPATIENT
Start: 2024-01-30 | End: 2024-02-16

## 2024-01-30 RX ORDER — ROSUVASTATIN CALCIUM 10 MG/1
10 TABLET, COATED ORAL DAILY
Qty: 30 TABLET | Refills: 0 | Status: SHIPPED | OUTPATIENT
Start: 2024-01-30 | End: 2024-02-29

## 2024-02-16 ENCOUNTER — OFFICE VISIT (OUTPATIENT)
Dept: FAMILY MEDICINE | Facility: CLINIC | Age: 44
End: 2024-02-16
Payer: MEDICARE

## 2024-02-16 VITALS
SYSTOLIC BLOOD PRESSURE: 107 MMHG | HEIGHT: 66 IN | BODY MASS INDEX: 26.09 KG/M2 | HEART RATE: 74 BPM | WEIGHT: 162.38 LBS | DIASTOLIC BLOOD PRESSURE: 63 MMHG | OXYGEN SATURATION: 99 %

## 2024-02-16 DIAGNOSIS — F33.9 DEPRESSION, RECURRENT: ICD-10-CM

## 2024-02-16 DIAGNOSIS — F32.1 MAJOR DEPRESSIVE DISORDER, SINGLE EPISODE, MODERATE: Primary | ICD-10-CM

## 2024-02-16 DIAGNOSIS — N18.31 CHRONIC KIDNEY DISEASE (CKD) STAGE G3A/A1, MODERATELY DECREASED GLOMERULAR FILTRATION RATE (GFR) BETWEEN 45-59 ML/MIN/1.73 SQUARE METER AND ALBUMINURIA CREATININE RATIO LESS THAN 30 MG/G: ICD-10-CM

## 2024-02-16 DIAGNOSIS — F17.200 TOBACCO USE DISORDER: ICD-10-CM

## 2024-02-16 DIAGNOSIS — E78.2 MIXED HYPERLIPIDEMIA: ICD-10-CM

## 2024-02-16 DIAGNOSIS — Z00.00 ENCOUNTER FOR ANNUAL WELLNESS VISIT (AWV) IN MEDICARE PATIENT: ICD-10-CM

## 2024-02-16 DIAGNOSIS — Z23 NEED FOR PROPHYLACTIC VACCINATION AGAINST STREPTOCOCCUS PNEUMONIAE (PNEUMOCOCCUS) AND INFLUENZA: ICD-10-CM

## 2024-02-16 DIAGNOSIS — F33.41 RECURRENT MAJOR DEPRESSIVE DISORDER, IN PARTIAL REMISSION: ICD-10-CM

## 2024-02-16 DIAGNOSIS — F41.9 ANXIETY: ICD-10-CM

## 2024-02-16 PROCEDURE — G0439 PPPS, SUBSEQ VISIT: HCPCS | Mod: S$GLB,,, | Performed by: INTERNAL MEDICINE

## 2024-02-16 PROCEDURE — 1159F MED LIST DOCD IN RCRD: CPT | Mod: CPTII,S$GLB,, | Performed by: INTERNAL MEDICINE

## 2024-02-16 PROCEDURE — 90686 IIV4 VACC NO PRSV 0.5 ML IM: CPT | Mod: S$GLB,,, | Performed by: INTERNAL MEDICINE

## 2024-02-16 PROCEDURE — G0008 ADMIN INFLUENZA VIRUS VAC: HCPCS | Mod: S$GLB,,, | Performed by: INTERNAL MEDICINE

## 2024-02-16 PROCEDURE — 3008F BODY MASS INDEX DOCD: CPT | Mod: CPTII,S$GLB,, | Performed by: INTERNAL MEDICINE

## 2024-02-16 PROCEDURE — 1158F ADVNC CARE PLAN TLK DOCD: CPT | Mod: CPTII,S$GLB,, | Performed by: INTERNAL MEDICINE

## 2024-02-16 PROCEDURE — 3074F SYST BP LT 130 MM HG: CPT | Mod: CPTII,S$GLB,, | Performed by: INTERNAL MEDICINE

## 2024-02-16 PROCEDURE — 90677 PCV20 VACCINE IM: CPT | Mod: S$GLB,,, | Performed by: INTERNAL MEDICINE

## 2024-02-16 PROCEDURE — 1160F RVW MEDS BY RX/DR IN RCRD: CPT | Mod: CPTII,S$GLB,, | Performed by: INTERNAL MEDICINE

## 2024-02-16 PROCEDURE — 3078F DIAST BP <80 MM HG: CPT | Mod: CPTII,S$GLB,, | Performed by: INTERNAL MEDICINE

## 2024-02-16 PROCEDURE — 99406 BEHAV CHNG SMOKING 3-10 MIN: CPT | Mod: S$GLB,,, | Performed by: INTERNAL MEDICINE

## 2024-02-16 PROCEDURE — 99213 OFFICE O/P EST LOW 20 MIN: CPT | Mod: 25,S$GLB,, | Performed by: INTERNAL MEDICINE

## 2024-02-16 PROCEDURE — G0009 ADMIN PNEUMOCOCCAL VACCINE: HCPCS | Mod: S$GLB,,, | Performed by: INTERNAL MEDICINE

## 2024-02-16 RX ORDER — VENLAFAXINE HYDROCHLORIDE 150 MG/1
150 CAPSULE, EXTENDED RELEASE ORAL DAILY
Qty: 90 CAPSULE | Refills: 3 | Status: SHIPPED | OUTPATIENT
Start: 2024-02-16 | End: 2025-02-15

## 2024-02-16 RX ORDER — HYDROXYZINE HYDROCHLORIDE 25 MG/1
25 TABLET, FILM COATED ORAL 3 TIMES DAILY
COMMUNITY
End: 2024-03-21

## 2024-02-16 RX ORDER — HYDROXYZINE PAMOATE 50 MG/1
50 CAPSULE ORAL DAILY PRN
Qty: 90 CAPSULE | Refills: 3 | Status: SHIPPED | OUTPATIENT
Start: 2024-02-16 | End: 2024-03-21 | Stop reason: SDUPTHER

## 2024-02-16 NOTE — ASSESSMENT & PLAN NOTE
I gave the patient written recommendations re the outstanding vaccinations kita re COVID , tetanus  Diet ,  exercise d/w patient & c/g  We discussed tobacco for 5 min

## 2024-02-16 NOTE — PROGRESS NOTES
Subjective:       Patient ID: Yovany Alcaraz is a 43 y.o. male.    Chief Complaint: Follow-up and Medication Refill (Follow up, mediCATION REFILL )      [facility]rail [facility] AWV HPI :-    Diet and Nutrition: healthy diet    Fracture Risk: no history of fractures; no recent explained fracture; no sudden unexplained fractures; previous musculoskeletal injuries    Physical Activity: doesnt exercise on a regular basis; recent dec. in physical activity;   v poor physical condition    Depression Risk: Occasionally feels sad, empty, or tearful; no loss of interest in activities; no significant changes in weight; no sleep disturbances or insomnia; no agitation; + loss of energy; no feelings of worthlessness or guilt; no thoughts of suicide; no history of depression; no history of mood disorders    Orientation:+ disorientation to time; + disorientation to date; + disorientation to place    Concentration and Memory: + decreased concentrating ability; + memory lapses / loss; he forgets words    Speech/Motor difficulties: no speech difficulties; no difficulty expressing formulated concepts; + difficulty with fine manipulative tasks; + difficulty writing/copying; + slowed reaction time; knocks things over when trying to pick them up    Hearing: Dec. hearing    Vision: no vision problems    Activities of Daily Living: Not able to bathe with limited or no assistance; not able to control urination and bowels; not able to dress with limited or no assistance; not able to feed self with limited or no assistance; not able to get out of chair or bed width limited or no assistance; not able to groom with limited or no assistance; not able to toilet with limited or no assistance  Instrumental Activities of Daily Living: not able to do house work with limited or no assistance; not able to grocery shop with limited or no assistance; not able to manage medications with limited or no assistance; not able to manage money with limited or no assistance; not  able to prepare meals with limited or no assistance; not able to use the phone with limited or no assistance    Falls Risknot Assessment: no frequent falls while walking :n/a .. no fall in the past year; no fall since last visit; no dizziness/vertigo    Home Safety: no unsafe nanette hazards; no unsafe stairs; no unsafe gas appliances; working smoke/CO detectors; wears protective head gear for biking/high velocity; use of seat belts; practicing 'safer sex'; no vision or hearing loss while driving; no fire arms; has hand bars in the bathroom/shower; good lighting in the home          Review of Systems Frail ROS :-    Constitutional: No fever, no night sweats, no significant weight gain, no significant weight loss, no exercise intolerance.    Eyes: No dry eyes, no irritation, no vision change.    ENMT:    Ears: No difficulty hearing, no ear pain    Nose: No frequent nosebleeds, no nose/sinus problems    Mouth/throat: No sore throat, no bleeding gums, no snoring, no dry mouth, no mouth ulcers, no oral abnormalities, no teeth problems    Cardiovascular: No chest pain, no arm pain on exertion, no shortness of breath when walking, no shortness of breath when lying down, no palpitations, no known heart murmur    Respiratory: No cough, no wheezing, no shortness of breath, no coughing up blood    Gastrointestinal: No abdominal pain, no vomiting, normal appetite, no diarrhea, not vomiting blood.    Genitourinary: + incontinence, + difficulty urinating, + hematuria, + increased frequency.    Musculoskeletal: + muscle aches, + muscle weakness, + arthralgias/joint pain, no back pain, + swelling in extremities.    Skin: No abnormal mole, no jaundice, no rashes.    Neurologic: No loss of consciousness, + weakness, + numbness, no seizures, no dizziness, no headaches.    Psychiatric: Occasional depression, no sleep disturbances, feeling safe in the relationship, no alcohol abuse.    Endocrine: + fatigue.    Hematologic/lymphatic:  No swollen glands, no bruising.    Allergic/immunologic: No runny nose, no sinus pressure, no itching or hives, no frequent sneezing.        Review for Opioid Screening: Pt does not have Rx for Opioids (If patient has Rx list here)      Review for Substance Use Disorders: Patient does not use substance (If patient has Rx list here)       Objective:      Physical Exam  Frail PE :-    Constitutional:  General appearance: Frail but rel young legally blind white male  Level of distress: : NAD   Ambulation: Ambulates with assistance     Head: Normocephalic, atraumatic.    ENMT    Oropharynx: Moist mucous membranes, no erythema, no exudates  Neck: Supple, trachea midline, no masses,FROM      Lungs  Respiratory effort: Poor  Auscultation: Breath sounds normal, poor air movement, CTA except as noted, no wheezing or rales/crackles, few ronchi    Cardiovascular:  Heart auscultation:RRR, normal S1, normal S2, no murmurs, no rubs, no gallops.  Neck vessels: No JVD, no carotid bruits      Abdominal examination.  Bowel sounds:normal  Inspection and palpation: Soft, nondistended, no tenderness, no guarding, no rebound tenderness, no masses, no CVA tenderness.        Musculoskeletal.  Deferred due to wheelchair status    Neurologic.  Gait: Tested secondary to electric scooter status    Creased monofilament sensation in upper and lower extremities      Skin.  Inspection and palpation: No rash, no lesions, no ulcers, no abnormal nevi, no induration, no nodules, good turgor, no jaundice.    Back: Deferred.    Assessment:       1. Major depressive disorder, single episode, moderate    2. Chronic kidney disease (CKD) stage G3a/A1, moderately decreased glomerular filtration rate (GFR) between 45-59 mL/min/1.73 square meter and albuminuria creatinine ratio less than 30 mg/g  Overview:  With a GFR of just under 60      3. Recurrent major depressive disorder, in partial remission    4. Need for prophylactic vaccination against Streptococcus  pneumoniae (pneumococcus) and influenza  -     (In Office Administered) Pneumococcal Conjugate Vaccine (20 Valent) (IM) (Preferred)  -     Influenza - Quadrivalent *Preferred* (6 months+) (PF)    5. Tobacco use disorder  -     Ambulatory referral/consult to Smoking Cessation Program; Future; Expected date: 02/23/2024    Other orders  -     venlafaxine (EFFEXOR-XR) 150 MG Cp24; Take 1 capsule (150 mg total) by mouth once daily.  Dispense: 90 capsule; Refill: 3  -     hydrOXYzine pamoate (VISTARIL) 50 MG Cap; Take 1 capsule (50 mg total) by mouth daily as needed (anxiety).  Dispense: 90 capsule; Refill: 3           Plan:       1. Major depressive disorder, single episode, moderate    2. Chronic kidney disease (CKD) stage G3a/A1, moderately decreased glomerular filtration rate (GFR) between 45-59 mL/min/1.73 square meter and albuminuria creatinine ratio less than 30 mg/g  Overview:  With a GFR of just under 60      3. Recurrent major depressive disorder, in partial remission    4. Need for prophylactic vaccination against Streptococcus pneumoniae (pneumococcus) and influenza  -     (In Office Administered) Pneumococcal Conjugate Vaccine (20 Valent) (IM) (Preferred)  -     Influenza - Quadrivalent *Preferred* (6 months+) (PF)    5. Tobacco use disorder  -     Ambulatory referral/consult to Smoking Cessation Program; Future; Expected date: 02/23/2024    Other orders  -     venlafaxine (EFFEXOR-XR) 150 MG Cp24; Take 1 capsule (150 mg total) by mouth once daily.  Dispense: 90 capsule; Refill: 3  -     hydrOXYzine pamoate (VISTARIL) 50 MG Cap; Take 1 capsule (50 mg total) by mouth daily as needed (anxiety).  Dispense: 90 capsule; Refill: 3          I offered to discuss end of life issues, including information on how to make advance directives that the patient could use to name someone who would make medical decisions on their behalf if they became too ill to make themselves.      __Patient declined       ___Patient is  interested, I provided paperwork and offered to discuss     N/a given his age

## 2024-02-29 ENCOUNTER — CLINICAL SUPPORT (OUTPATIENT)
Dept: SMOKING CESSATION | Facility: CLINIC | Age: 44
End: 2024-02-29
Payer: COMMERCIAL

## 2024-02-29 DIAGNOSIS — F17.200 TOBACCO USE DISORDER: Primary | ICD-10-CM

## 2024-02-29 PROCEDURE — 99999 PR PBB SHADOW E&M-EST. PATIENT-LVL II: CPT | Mod: PBBFAC,,,

## 2024-02-29 PROCEDURE — 99404 PREV MED CNSL INDIV APPRX 60: CPT | Mod: S$GLB,,, | Performed by: GENERAL PRACTICE

## 2024-02-29 RX ORDER — ASPIRIN/CALCIUM CARB/MAGNESIUM 325 MG
TABLET ORAL
Qty: 72 LOZENGE | Refills: 0 | Status: SHIPPED | OUTPATIENT
Start: 2024-02-29 | End: 2024-04-11 | Stop reason: SDUPTHER

## 2024-02-29 NOTE — Clinical Note
Patient will be participating in weekly tobacco cessation meetings and will begin the prescribed tobacco cessation medication regime of the 4 mg lozenge.

## 2024-03-14 ENCOUNTER — LAB VISIT (OUTPATIENT)
Dept: LAB | Facility: CLINIC | Age: 44
End: 2024-03-14
Payer: MEDICARE

## 2024-03-14 ENCOUNTER — CLINICAL SUPPORT (OUTPATIENT)
Dept: SMOKING CESSATION | Facility: CLINIC | Age: 44
End: 2024-03-14

## 2024-03-14 ENCOUNTER — TELEPHONE (OUTPATIENT)
Dept: SMOKING CESSATION | Facility: CLINIC | Age: 44
End: 2024-03-14
Payer: MEDICARE

## 2024-03-14 DIAGNOSIS — F17.200 TOBACCO USE DISORDER: Primary | ICD-10-CM

## 2024-03-14 DIAGNOSIS — R73.9 ELEVATED BLOOD SUGAR: ICD-10-CM

## 2024-03-14 DIAGNOSIS — E78.2 MIXED HYPERLIPIDEMIA: ICD-10-CM

## 2024-03-14 DIAGNOSIS — N18.31 CHRONIC KIDNEY DISEASE (CKD) STAGE G3A/A1, MODERATELY DECREASED GLOMERULAR FILTRATION RATE (GFR) BETWEEN 45-59 ML/MIN/1.73 SQUARE METER AND ALBUMINURIA CREATININE RATIO LESS THAN 30 MG/G: ICD-10-CM

## 2024-03-14 LAB
ALBUMIN SERPL BCP-MCNC: 4.2 G/DL (ref 3.5–5.2)
ALBUMIN/CREAT UR: 3.3 UG/MG (ref 0–30)
ALP SERPL-CCNC: 87 U/L (ref 55–135)
ALT SERPL W/O P-5'-P-CCNC: 39 U/L (ref 10–44)
ANION GAP SERPL CALC-SCNC: 10 MMOL/L (ref 8–16)
AST SERPL-CCNC: 28 U/L (ref 10–40)
BILIRUB SERPL-MCNC: 0.6 MG/DL (ref 0.1–1)
BUN SERPL-MCNC: 23 MG/DL (ref 6–20)
CALCIUM SERPL-MCNC: 9.5 MG/DL (ref 8.7–10.5)
CHLORIDE SERPL-SCNC: 107 MMOL/L (ref 95–110)
CHOLEST SERPL-MCNC: 126 MG/DL (ref 120–199)
CHOLEST/HDLC SERPL: 3.5 {RATIO} (ref 2–5)
CO2 SERPL-SCNC: 23 MMOL/L (ref 23–29)
CREAT SERPL-MCNC: 1.3 MG/DL (ref 0.5–1.4)
CREAT UR-MCNC: 215 MG/DL (ref 23–375)
EST. GFR  (NO RACE VARIABLE): >60 ML/MIN/1.73 M^2
ESTIMATED AVG GLUCOSE: 94 MG/DL (ref 68–131)
GLUCOSE SERPL-MCNC: 116 MG/DL (ref 70–110)
HBA1C MFR BLD: 4.9 % (ref 4–5.6)
HDLC SERPL-MCNC: 36 MG/DL (ref 40–75)
HDLC SERPL: 28.6 % (ref 20–50)
LDLC SERPL CALC-MCNC: 71.6 MG/DL (ref 63–159)
MICROALBUMIN UR DL<=1MG/L-MCNC: 7 UG/ML
NONHDLC SERPL-MCNC: 90 MG/DL
POTASSIUM SERPL-SCNC: 4.6 MMOL/L (ref 3.5–5.1)
PROT SERPL-MCNC: 7.3 G/DL (ref 6–8.4)
SODIUM SERPL-SCNC: 140 MMOL/L (ref 136–145)
TRIGL SERPL-MCNC: 92 MG/DL (ref 30–150)

## 2024-03-14 PROCEDURE — 82043 UR ALBUMIN QUANTITATIVE: CPT | Performed by: INTERNAL MEDICINE

## 2024-03-14 PROCEDURE — 99999 PR PBB SHADOW E&M-EST. PATIENT-LVL II: CPT | Mod: PBBFAC,,,

## 2024-03-14 PROCEDURE — 80053 COMPREHEN METABOLIC PANEL: CPT | Performed by: INTERNAL MEDICINE

## 2024-03-14 PROCEDURE — 80061 LIPID PANEL: CPT | Performed by: INTERNAL MEDICINE

## 2024-03-14 PROCEDURE — 83036 HEMOGLOBIN GLYCOSYLATED A1C: CPT | Performed by: INTERNAL MEDICINE

## 2024-03-14 PROCEDURE — 36415 COLL VENOUS BLD VENIPUNCTURE: CPT | Mod: ,,, | Performed by: INTERNAL MEDICINE

## 2024-03-21 ENCOUNTER — OFFICE VISIT (OUTPATIENT)
Dept: FAMILY MEDICINE | Facility: CLINIC | Age: 44
End: 2024-03-21
Payer: MEDICARE

## 2024-03-21 ENCOUNTER — TELEPHONE (OUTPATIENT)
Dept: OTOLARYNGOLOGY | Facility: CLINIC | Age: 44
End: 2024-03-21
Payer: MEDICARE

## 2024-03-21 VITALS
TEMPERATURE: 98 F | WEIGHT: 155.63 LBS | BODY MASS INDEX: 25.01 KG/M2 | HEART RATE: 75 BPM | DIASTOLIC BLOOD PRESSURE: 70 MMHG | OXYGEN SATURATION: 98 % | SYSTOLIC BLOOD PRESSURE: 118 MMHG | HEIGHT: 66 IN

## 2024-03-21 DIAGNOSIS — B02.8 HERPES ZOSTER WITH OTHER COMPLICATION: ICD-10-CM

## 2024-03-21 DIAGNOSIS — F41.9 ANXIETY: Primary | ICD-10-CM

## 2024-03-21 DIAGNOSIS — H93.233 HEARING ABNORMALLY ACUTE, BILATERAL: ICD-10-CM

## 2024-03-21 PROCEDURE — 3074F SYST BP LT 130 MM HG: CPT | Mod: CPTII,S$GLB,, | Performed by: INTERNAL MEDICINE

## 2024-03-21 PROCEDURE — 99214 OFFICE O/P EST MOD 30 MIN: CPT | Mod: S$GLB,,, | Performed by: INTERNAL MEDICINE

## 2024-03-21 PROCEDURE — 3078F DIAST BP <80 MM HG: CPT | Mod: CPTII,S$GLB,, | Performed by: INTERNAL MEDICINE

## 2024-03-21 PROCEDURE — 1160F RVW MEDS BY RX/DR IN RCRD: CPT | Mod: CPTII,S$GLB,, | Performed by: INTERNAL MEDICINE

## 2024-03-21 PROCEDURE — 3044F HG A1C LEVEL LT 7.0%: CPT | Mod: CPTII,S$GLB,, | Performed by: INTERNAL MEDICINE

## 2024-03-21 PROCEDURE — 1159F MED LIST DOCD IN RCRD: CPT | Mod: CPTII,S$GLB,, | Performed by: INTERNAL MEDICINE

## 2024-03-21 PROCEDURE — 3008F BODY MASS INDEX DOCD: CPT | Mod: CPTII,S$GLB,, | Performed by: INTERNAL MEDICINE

## 2024-03-21 RX ORDER — HYDROXYZINE PAMOATE 50 MG/1
50 CAPSULE ORAL EVERY 8 HOURS PRN
Qty: 90 CAPSULE | Refills: 2 | Status: SHIPPED | OUTPATIENT
Start: 2024-03-21 | End: 2024-06-19

## 2024-03-21 RX ORDER — VALACYCLOVIR HYDROCHLORIDE 1 G/1
1000 TABLET, FILM COATED ORAL EVERY 12 HOURS
Qty: 14 TABLET | Refills: 0 | Status: SHIPPED | OUTPATIENT
Start: 2024-03-21 | End: 2024-03-28

## 2024-03-21 NOTE — PROGRESS NOTES
Subjective:       Patient ID: Yovany Alcaraz is a 43 y.o. male.    Chief Complaint: Follow-up      Patient is established already .......... presents today for a f/u visit , to discuss labs results and for management of the chronic conditions.        Follow-up  This is a chronic problem. The current episode started more than 1 year ago. The problem occurs constantly. The problem has been gradually worsening. Associated symptoms include a rash. Pertinent negatives include no fever. Associated symptoms comments: Hearing loss, skin rash. Nothing aggravates the symptoms. Treatments tried: See MAR. The treatment provided significant relief.     Review of Systems   Constitutional:  Negative for activity change, appetite change and fever.   Respiratory: Negative.     Cardiovascular: Negative.    Gastrointestinal: Negative.    Musculoskeletal: Negative.    Integumentary:  Positive for rash.         Objective:      Physical Exam  Vitals and nursing note reviewed.   Constitutional:       Appearance: Normal appearance. He is obese.   Cardiovascular:      Rate and Rhythm: Normal rate and regular rhythm.      Pulses: Normal pulses.      Heart sounds: Normal heart sounds. No murmur heard.     No friction rub. No gallop.   Pulmonary:      Effort: Pulmonary effort is normal.      Breath sounds: Normal breath sounds. No wheezing.   Skin:     General: Skin is warm and dry.      Findings: Rash present.      Comments: Mild red macuopapular rash of L buttock   Neurological:      Mental Status: He is alert.   Psychiatric:         Mood and Affect: Mood normal.         Assessment:       1. Anxiety  Overview:  With no SI,HI    Assessment & Plan:  Inc vistaril to 50 mg q 8hrs      2. Herpes zoster with other complication  Overview:  Of the top of his buttocks    Assessment & Plan:  Mild rash of L buttock  Valtrex twice a day for a week      3. Hearing abnormally acute, bilateral  Overview:  Dec hearing b/l      Assessment & Plan:  Refer to ENT  , audiology    Orders:  -     Ambulatory referral/consult to ENT; Future; Expected date: 03/28/2024  -     Ambulatory referral/consult to Audiology; Future; Expected date: 03/28/2024    Other orders  -     hydrOXYzine pamoate (VISTARIL) 50 MG Cap; Take 1 capsule (50 mg total) by mouth every 8 (eight) hours as needed (anxiety).  Dispense: 90 capsule; Refill: 2  -     valACYclovir (VALTREX) 1000 MG tablet; Take 1 tablet (1,000 mg total) by mouth every 12 (twelve) hours. for 7 days  Dispense: 14 tablet; Refill: 0           Plan:       1. Anxiety  Overview:  With no SI,HI    Assessment & Plan:  Inc vistaril to 50 mg q 8hrs      2. Herpes zoster with other complication  Overview:  Of the top of his buttocks    Assessment & Plan:  Mild rash of L buttock  Valtrex twice a day for a week      3. Hearing abnormally acute, bilateral  Overview:  Dec hearing b/l      Assessment & Plan:  Refer to ENT , audiology    Orders:  -     Ambulatory referral/consult to ENT; Future; Expected date: 03/28/2024  -     Ambulatory referral/consult to Audiology; Future; Expected date: 03/28/2024    Other orders  -     hydrOXYzine pamoate (VISTARIL) 50 MG Cap; Take 1 capsule (50 mg total) by mouth every 8 (eight) hours as needed (anxiety).  Dispense: 90 capsule; Refill: 2  -     valACYclovir (VALTREX) 1000 MG tablet; Take 1 tablet (1,000 mg total) by mouth every 12 (twelve) hours. for 7 days  Dispense: 14 tablet; Refill: 0

## 2024-03-28 ENCOUNTER — CLINICAL SUPPORT (OUTPATIENT)
Dept: SMOKING CESSATION | Facility: CLINIC | Age: 44
End: 2024-03-28

## 2024-03-28 DIAGNOSIS — F17.200 TOBACCO USE DISORDER: Primary | ICD-10-CM

## 2024-03-28 PROCEDURE — 99999 PR PBB SHADOW E&M-EST. PATIENT-LVL II: CPT | Mod: PBBFAC,,,

## 2024-03-28 PROCEDURE — 99404 PREV MED CNSL INDIV APPRX 60: CPT | Mod: S$GLB,,, | Performed by: GENERAL PRACTICE

## 2024-03-28 NOTE — Clinical Note
Patient is currently smoking 4-5 cpd and using 2-3 lozenges with no negative side effects at this time. We discussed session 1 material including triggers, reduction, goal setting and urges. Patient does find lozenges helpful and we also discussed new habits. We discussed setting a quit date but patient has not set one at this time. Patient has a follow up in 2 weeks.

## 2024-03-28 NOTE — PROGRESS NOTES
Individual Follow-Up Form    3/28/2024    Quit Date:     Clinical Status of Patient: Outpatient    Length of Service: 60 minutes    Continuing Medication: yes  Nicotine Lozenges    Other Medications:      Target Symptoms: Withdrawal and medication side effects. The following were  rated moderate (3) to severe (4) on TCRS:  Moderate (3): desire or crave;discussed with patient   Severe (4): none    Comments: Patient is currently smoking 4-5 cpd and using 2-3 lozenges with no negative side effects at this time. We discussed session 1 material including triggers, reduction, goal setting and urges. Patient does find lozenges helpful and we also discussed new habits. We discussed setting a quit date but patient has not set one at this time. Patient has a follow up in 2 weeks.    Diagnosis: F17.200    Next Visit: 2 weeks

## 2024-04-01 ENCOUNTER — TELEPHONE (OUTPATIENT)
Dept: OTOLARYNGOLOGY | Facility: CLINIC | Age: 44
End: 2024-04-01
Payer: MEDICARE

## 2024-04-01 NOTE — TELEPHONE ENCOUNTER
Attempted to contact Yovany Alcaraz to discuss  consult scheduled for pt on 04/03/2024 .    Left voice mail to return our call at [phone] on [phone] (home).    Levi Titus MA

## 2024-04-03 ENCOUNTER — CLINICAL SUPPORT (OUTPATIENT)
Dept: AUDIOLOGY | Facility: CLINIC | Age: 44
End: 2024-04-03
Payer: MEDICARE

## 2024-04-03 ENCOUNTER — OFFICE VISIT (OUTPATIENT)
Dept: OTOLARYNGOLOGY | Facility: CLINIC | Age: 44
End: 2024-04-03
Payer: MEDICARE

## 2024-04-03 VITALS — HEIGHT: 66 IN | BODY MASS INDEX: 25.96 KG/M2 | WEIGHT: 161.5 LBS

## 2024-04-03 DIAGNOSIS — H91.90 HEARING LOSS, UNSPECIFIED HEARING LOSS TYPE, UNSPECIFIED LATERALITY: ICD-10-CM

## 2024-04-03 DIAGNOSIS — H90.3 SENSORINEURAL HEARING LOSS (SNHL), BILATERAL: Primary | ICD-10-CM

## 2024-04-03 DIAGNOSIS — H91.93 BILATERAL HEARING LOSS, UNSPECIFIED HEARING LOSS TYPE: Primary | ICD-10-CM

## 2024-04-03 PROCEDURE — 3008F BODY MASS INDEX DOCD: CPT | Mod: CPTII,S$GLB,, | Performed by: OTOLARYNGOLOGY

## 2024-04-03 PROCEDURE — 1160F RVW MEDS BY RX/DR IN RCRD: CPT | Mod: CPTII,S$GLB,, | Performed by: OTOLARYNGOLOGY

## 2024-04-03 PROCEDURE — 3044F HG A1C LEVEL LT 7.0%: CPT | Mod: CPTII,S$GLB,, | Performed by: OTOLARYNGOLOGY

## 2024-04-03 PROCEDURE — 99999 PR PBB SHADOW E&M-EST. PATIENT-LVL III: CPT | Mod: PBBFAC,,, | Performed by: OTOLARYNGOLOGY

## 2024-04-03 PROCEDURE — 1159F MED LIST DOCD IN RCRD: CPT | Mod: CPTII,S$GLB,, | Performed by: OTOLARYNGOLOGY

## 2024-04-03 PROCEDURE — 92557 COMPREHENSIVE HEARING TEST: CPT | Mod: S$GLB,,, | Performed by: AUDIOLOGIST

## 2024-04-03 PROCEDURE — 92567 TYMPANOMETRY: CPT | Mod: S$GLB,,, | Performed by: AUDIOLOGIST

## 2024-04-03 PROCEDURE — 99203 OFFICE O/P NEW LOW 30 MIN: CPT | Mod: S$GLB,,, | Performed by: OTOLARYNGOLOGY

## 2024-04-03 NOTE — PROGRESS NOTES
Subjective:       Patient ID: Yovany Alcaraz is a 43 y.o. male.    Chief Complaint: Hearing Loss (Pt c/o hearing loss for a few month )      This 43-year-old comes in with his family he is blind and they report that he has issues that is suggest he may have some hearing loss namely heal walk into a room with has a conversation going on and just start talking as if he has not heard that there was a conversation and progress for example.  The patient himself tells me it is hard for him to  whether he has hearing problems or not.  In our communication just in the office he seems to be able to communicate with me fairly well.        Objective:      ENT Physical Exam    His right ear canal and tympanic membrane are normal     On the left side he has just a very small amount of loose black wax that is deep in the canal up against the eardrum but only touching it and a small location and he is touchy in really not amenable to me removing that neither do I think that is bothering him or likely to him.  His hearing             Assessment:       1. Bilateral hearing loss, unspecified hearing loss type    2. Hearing loss, unspecified hearing loss type, unspecified laterality         Plan:          I am shows worse hearing in the right ear than the left with a left ear having mild hearing loss in the low and mid frequencies and then dropping off steeply to a more prominent hearing loss in the high frequencies in the right ear have not a moderate hearing loss in the low and mid frequencies and likewise dropping off in the high frequencies.    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

## 2024-04-03 NOTE — PROGRESS NOTES
Yovany Alcaraz was seen 04/03/2024 by Dr. Hassan and referred for an audiological evaluation. Pt was accompanied by his caretaker, Ashley, during today's visit. Pertinent complaints today include hearing loss. Pt said hearing has decreased over the past few months. Otoscopy revealed some cerumen in the left ear. The tympanic membrane was visualized AU prior to proceeding with the hearing testing.     Results reveal a moderate-to-severe sensorineural hearing loss for the right ear and  mild-to-severe sensorineural hearing loss for the left ear.    Speech Reception Thresholds were  55 dBHL for the right ear and 35 dBHL for the left ear.    Word recognition scores were poor for the right ear (completed at [facility] due to pt statement that it was too loud at usual 40 dBSL) and excellent for the left ear.  Tympanograms were Type A for the right ear and Type A for the left ear.    Audiogram results were reviewed in detail with patient and all questions were answered. Results will be reviewed by the referring provider at the completion of this note. All complaints were addressed during this visit to the patient's satisfaction.    Recommendations:  Follow up with ENT  Annual audiogram  Hearing protection in noise  Hearing aid consultation after medical clearance

## 2024-04-11 ENCOUNTER — CLINICAL SUPPORT (OUTPATIENT)
Dept: SMOKING CESSATION | Facility: CLINIC | Age: 44
End: 2024-04-11
Payer: COMMERCIAL

## 2024-04-11 DIAGNOSIS — F17.200 TOBACCO USE DISORDER: Primary | ICD-10-CM

## 2024-04-11 RX ORDER — ASPIRIN/CALCIUM CARB/MAGNESIUM 325 MG
TABLET ORAL
Qty: 72 LOZENGE | Refills: 0 | Status: SHIPPED | OUTPATIENT
Start: 2024-04-11

## 2024-04-11 NOTE — Clinical Note
Patient is currently smoking 3-4 cpd and using 3-4 lozenges per day. We discussed using more lozenges to help curb cravings. We also discussed via telephone setting a quit date and patient has set it for 4/18/24. We discussed session 2 material including health and nicotine withdrawal symptoms. Patient has a follow up in 2 weeks.

## 2024-04-11 NOTE — PROGRESS NOTES
Individual Follow-Up Form    4/11/2024    Quit Date:     Clinical Status of Patient: Outpatient    Length of Service: 45 minutes    Continuing Medication: yes  Nicotine Lozenges    Other Medications:      Target Symptoms: Withdrawal and medication side effects. The following were  rated moderate (3) to severe (4) on TCRS:  Moderate (3): desire or crave;discussed with patient  Severe (4): none    Comments: Patient is currently smoking 3-4 cpd and using 3-4 lozenges per day. We discussed using more lozenges to help curb cravings. We also discussed via telephone setting a quit date and patient has set it for 4/18/24. We discussed session 2 material including health and nicotine withdrawal symptoms. Patient has a follow up in 2 weeks.    Diagnosis: F17.200    Next Visit: 2 weeks

## 2024-04-25 ENCOUNTER — TELEPHONE (OUTPATIENT)
Dept: SMOKING CESSATION | Facility: CLINIC | Age: 44
End: 2024-04-25
Payer: MEDICARE

## 2024-05-15 ENCOUNTER — TELEPHONE (OUTPATIENT)
Dept: SMOKING CESSATION | Facility: CLINIC | Age: 44
End: 2024-05-15
Payer: MEDICARE

## 2024-05-20 PROBLEM — Z00.00 ENCOUNTER FOR ANNUAL WELLNESS VISIT (AWV) IN MEDICARE PATIENT: Status: RESOLVED | Noted: 2023-08-09 | Resolved: 2024-05-20

## 2024-05-23 ENCOUNTER — TELEPHONE (OUTPATIENT)
Dept: SMOKING CESSATION | Facility: CLINIC | Age: 44
End: 2024-05-23
Payer: MEDICARE

## 2024-05-30 ENCOUNTER — TELEPHONE (OUTPATIENT)
Dept: FAMILY MEDICINE | Facility: CLINIC | Age: 44
End: 2024-05-30
Payer: MEDICARE

## 2024-05-30 NOTE — TELEPHONE ENCOUNTER
[facility].    ----- Message from Toby Barnard MD sent at 5/27/2024  5:26 PM CDT -----  Regarding: Rosuvastatin  Hi   Please check with the patient and his mom Ms. Sloan michael Yovany if he's taking his rosuvastatin  ( Crestor ) for his cholesterol  His insurance sent me an email  And please let me know  ASHLYN Cameron

## 2024-06-03 ENCOUNTER — PATIENT MESSAGE (OUTPATIENT)
Dept: FAMILY MEDICINE | Facility: CLINIC | Age: 44
End: 2024-06-03
Payer: MEDICARE

## 2024-06-25 ENCOUNTER — OFFICE VISIT (OUTPATIENT)
Dept: FAMILY MEDICINE | Facility: CLINIC | Age: 44
End: 2024-06-25
Payer: MEDICAID

## 2024-06-25 VITALS
WEIGHT: 151 LBS | SYSTOLIC BLOOD PRESSURE: 95 MMHG | DIASTOLIC BLOOD PRESSURE: 60 MMHG | OXYGEN SATURATION: 98 % | HEIGHT: 66 IN | RESPIRATION RATE: 20 BRPM | BODY MASS INDEX: 24.27 KG/M2 | HEART RATE: 88 BPM

## 2024-06-25 DIAGNOSIS — H93.233 HEARING ABNORMALLY ACUTE, BILATERAL: ICD-10-CM

## 2024-06-25 DIAGNOSIS — G47.00 INSOMNIA, UNSPECIFIED TYPE: ICD-10-CM

## 2024-06-25 DIAGNOSIS — F41.9 ANXIETY: ICD-10-CM

## 2024-06-25 DIAGNOSIS — F32.4 MAJOR DEPRESSIVE DISORDER WITH SINGLE EPISODE, IN PARTIAL REMISSION: ICD-10-CM

## 2024-06-25 DIAGNOSIS — E78.2 MIXED HYPERLIPIDEMIA: ICD-10-CM

## 2024-06-25 DIAGNOSIS — Z23 NEED FOR PROPHYLACTIC VACCINATION WITH COMBINED DIPHTHERIA-TETANUS-PERTUSSIS (DTP) VACCINE: Primary | ICD-10-CM

## 2024-06-25 PROCEDURE — 1160F RVW MEDS BY RX/DR IN RCRD: CPT | Mod: CPTII,S$GLB,, | Performed by: INTERNAL MEDICINE

## 2024-06-25 PROCEDURE — 1159F MED LIST DOCD IN RCRD: CPT | Mod: CPTII,S$GLB,, | Performed by: INTERNAL MEDICINE

## 2024-06-25 PROCEDURE — 99214 OFFICE O/P EST MOD 30 MIN: CPT | Mod: S$GLB,,, | Performed by: INTERNAL MEDICINE

## 2024-06-25 PROCEDURE — 3074F SYST BP LT 130 MM HG: CPT | Mod: CPTII,S$GLB,, | Performed by: INTERNAL MEDICINE

## 2024-06-25 PROCEDURE — G2211 COMPLEX E/M VISIT ADD ON: HCPCS | Mod: S$GLB,,, | Performed by: INTERNAL MEDICINE

## 2024-06-25 PROCEDURE — 3078F DIAST BP <80 MM HG: CPT | Mod: CPTII,S$GLB,, | Performed by: INTERNAL MEDICINE

## 2024-06-25 PROCEDURE — 3044F HG A1C LEVEL LT 7.0%: CPT | Mod: CPTII,S$GLB,, | Performed by: INTERNAL MEDICINE

## 2024-06-25 PROCEDURE — 3008F BODY MASS INDEX DOCD: CPT | Mod: CPTII,S$GLB,, | Performed by: INTERNAL MEDICINE

## 2024-06-25 RX ORDER — ROSUVASTATIN CALCIUM 20 MG/1
20 TABLET, COATED ORAL NIGHTLY
Qty: 90 TABLET | Refills: 1 | Status: SHIPPED | OUTPATIENT
Start: 2024-06-25 | End: 2024-12-22

## 2024-06-25 RX ORDER — TRAZODONE HYDROCHLORIDE 150 MG/1
150 TABLET ORAL NIGHTLY PRN
Qty: 90 TABLET | Refills: 1 | Status: SHIPPED | OUTPATIENT
Start: 2024-06-25 | End: 2024-12-22

## 2024-06-25 RX ORDER — VENLAFAXINE HYDROCHLORIDE 150 MG/1
150 CAPSULE, EXTENDED RELEASE ORAL DAILY
Qty: 90 CAPSULE | Refills: 1 | Status: SHIPPED | OUTPATIENT
Start: 2024-06-25 | End: 2024-12-22

## 2024-06-25 RX ORDER — HYDROXYZINE HYDROCHLORIDE 50 MG/1
50 TABLET, FILM COATED ORAL 4 TIMES DAILY
COMMUNITY

## 2024-06-25 RX ORDER — HYDROXYZINE PAMOATE 50 MG/1
50 CAPSULE ORAL EVERY 6 HOURS PRN
Qty: 120 CAPSULE | Refills: 1 | Status: SHIPPED | OUTPATIENT
Start: 2024-06-25 | End: 2024-09-23

## 2024-06-25 NOTE — PROGRESS NOTES
Subjective:       Patient ID: Yovany Alcaraz is a 43 y.o. male.    Chief Complaint: Follow-up (Follow up)      Patient is established already .......... presents today for a f/u visit , to discuss labs results and for management of the chronic conditions.        Follow-up  This is a chronic problem. The current episode started more than 1 year ago. The problem occurs intermittently. The problem has been gradually worsening. Pertinent negatives include no fever. Associated symptoms comments: anxiety. Nothing aggravates the symptoms. Treatments tried: vistaril, effexor. The treatment provided moderate relief.     Review of Systems   Constitutional:  Negative for activity change, appetite change and fever.   Respiratory: Negative.     Cardiovascular: Negative.    Gastrointestinal: Negative.    Musculoskeletal: Negative.    Psychiatric/Behavioral:  Positive for dysphoric mood. The patient is nervous/anxious.          Objective:      Physical Exam  Vitals and nursing note reviewed.   Constitutional:       Comments: Legally blind   Cardiovascular:      Rate and Rhythm: Normal rate and regular rhythm.      Pulses: Normal pulses.      Heart sounds: Normal heart sounds. No murmur heard.     No friction rub. No gallop.   Pulmonary:      Effort: Pulmonary effort is normal.      Breath sounds: Normal breath sounds. No wheezing.   Musculoskeletal:         General: Normal range of motion.   Skin:     General: Skin is warm and dry.   Neurological:      Mental Status: He is alert.         Assessment:       1. Need for prophylactic vaccination with combined diphtheria-tetanus-pertussis (DTP) vaccine    2. Insomnia, unspecified type  -     traZODone (DESYREL) 150 MG tablet; Take 1 tablet (150 mg total) by mouth nightly as needed for Insomnia.  Dispense: 90 tablet; Refill: 1    3. Anxiety  Overview:  With no SI,HI    Assessment & Plan:  Q.6 hours p.r.n.    Orders:  -     hydrOXYzine pamoate (VISTARIL) 50 MG Cap; Take 1 capsule (50 mg  total) by mouth every 6 (six) hours as needed (anxiety).  Dispense: 120 capsule; Refill: 1    4. Mixed hyperlipidemia  Overview:  Markedly elevated      5. Major depressive disorder with single episode, in partial remission  Overview:  Stable  Just increase anxiety    Assessment & Plan:  Continue Effexor and hydroxyzine  [facility]      6. Hearing abnormally acute, bilateral  Overview:  Dec hearing b/l      Assessment & Plan:  He saw Dr. Hassan from ENT  He was referred for a hearing aid  We are going to follow up on      Other orders  -     venlafaxine (EFFEXOR-XR) 150 MG Cp24; Take 1 capsule (150 mg total) by mouth once daily.  Dispense: 90 capsule; Refill: 1  -     rosuvastatin (CRESTOR) 20 MG tablet; Take 1 tablet (20 mg total) by mouth every evening.  Dispense: 90 tablet; Refill: 1           Plan:       1. Need for prophylactic vaccination with combined diphtheria-tetanus-pertussis (DTP) vaccine    2. Insomnia, unspecified type  -     traZODone (DESYREL) 150 MG tablet; Take 1 tablet (150 mg total) by mouth nightly as needed for Insomnia.  Dispense: 90 tablet; Refill: 1    3. Anxiety  Overview:  With no SI,HI    Assessment & Plan:  Q.6 hours p.r.n.    Orders:  -     hydrOXYzine pamoate (VISTARIL) 50 MG Cap; Take 1 capsule (50 mg total) by mouth every 6 (six) hours as needed (anxiety).  Dispense: 120 capsule; Refill: 1    4. Mixed hyperlipidemia  Overview:  Markedly elevated      5. Major depressive disorder with single episode, in partial remission  Overview:  Stable  Just increase anxiety    Assessment & Plan:  Continue Effexor and hydroxyzine  [facility]      6. Hearing abnormally acute, bilateral  Overview:  Dec hearing b/l      Assessment & Plan:  He saw Dr. Hassan from ENT  He was referred for a hearing aid  We are going to follow up on      Other orders  -     venlafaxine (EFFEXOR-XR) 150 MG Cp24; Take 1 capsule (150 mg total) by mouth once  daily.  Dispense: 90 capsule; Refill: 1  -     rosuvastatin (CRESTOR) 20 MG tablet; Take 1 tablet (20 mg total) by mouth every evening.  Dispense: 90 tablet; Refill: 1        Visit today included increased complexity associated with the care of the episodic problem.   I addressed and managing the longitudinal care of the patient due to the serious and/or complex managed problem(s).  .

## 2024-07-04 ENCOUNTER — PATIENT MESSAGE (OUTPATIENT)
Dept: ADMINISTRATIVE | Facility: HOSPITAL | Age: 44
End: 2024-07-04
Payer: MEDICAID

## 2024-08-30 DIAGNOSIS — F41.9 ANXIETY: ICD-10-CM

## 2024-08-30 RX ORDER — HYDROXYZINE PAMOATE 50 MG/1
50 CAPSULE ORAL EVERY 6 HOURS PRN
Qty: 120 CAPSULE | Refills: 1 | Status: SHIPPED | OUTPATIENT
Start: 2024-08-30 | End: 2024-11-28

## 2024-08-30 NOTE — TELEPHONE ENCOUNTER
Refill Routing Note   Medication(s) are not appropriate for processing by [facility] for the following reason(s):        Outside of protocol    [facility] action(s):  Route               Appointments  past 12m or future 3m with PCP    Date Provider   Last Visit   6/25/2024 Toby Barnard MD   Next Visit   9/25/2024 Toby Barnard MD   ED visits in past 90 days: 0        Note composed:2:02 PM 08/30/2024

## 2024-09-23 NOTE — ASSESSMENT & PLAN NOTE
Continue Effexor and hydroxyzine  [facility]   Consent: The patient's consent was obtained including but not limited to risks of crusting, scabbing, blistering, scarring, darker or lighter pigmentary change, recurrence, incomplete removal and infection. Post-Care Instructions: I reviewed with the patient in detail post-care instructions. Patient is to wear sunprotection, and avoid picking at any of the treated lesions. Pt may apply Vaseline to crusted or scabbing areas. Render Post-Care Instructions In Note?: no Detail Level: Detailed Duration Of Freeze Thaw-Cycle (Seconds): 0 Show Aperture Variable?: Yes Application Tool (Optional): Cry-AC Medical Necessity Clause: This procedure was medically necessary because the lesions that were treated were: Spray Paint Text: The liquid nitrogen was applied to the skin utilizing a spray paint frosting technique. Medical Necessity Information: It is in your best interest to select a reason for this procedure from the list below. All of these items fulfill various CMS LCD requirements except the new and changing color options.

## 2024-09-25 ENCOUNTER — OFFICE VISIT (OUTPATIENT)
Dept: FAMILY MEDICINE | Facility: CLINIC | Age: 44
End: 2024-09-25
Payer: MEDICARE

## 2024-09-25 VITALS
BODY MASS INDEX: 24.17 KG/M2 | OXYGEN SATURATION: 98 % | DIASTOLIC BLOOD PRESSURE: 66 MMHG | SYSTOLIC BLOOD PRESSURE: 112 MMHG | HEIGHT: 66 IN | HEART RATE: 90 BPM | WEIGHT: 150.38 LBS

## 2024-09-25 DIAGNOSIS — Z23 NEED FOR PROPHYLACTIC VACCINATION AND INOCULATION AGAINST CHOLERA ALONE: Primary | ICD-10-CM

## 2024-09-25 DIAGNOSIS — F17.200 TOBACCO USE DISORDER: ICD-10-CM

## 2024-09-25 DIAGNOSIS — Z09 FOLLOW-UP EXAM: ICD-10-CM

## 2024-09-25 PROBLEM — Z72.0 TOBACCO ABUSE: Status: ACTIVE | Noted: 2024-02-16

## 2024-09-25 RX ORDER — ROSUVASTATIN CALCIUM 20 MG/1
20 TABLET, COATED ORAL NIGHTLY
Qty: 90 TABLET | Refills: 3 | Status: SHIPPED | OUTPATIENT
Start: 2024-09-25 | End: 2025-09-26

## 2024-09-25 NOTE — PROGRESS NOTES
Subjective:       Patient ID: Yovany Alcaraz is a 43 y.o. male.    Chief Complaint: Hypertension (Patient is here for elevated blood pressure and medication refill. )      Patient is established already .......... presents today for a f/u visit , to discuss restlessness, f/u on tobacco    Hypertension    Follow-up  This is a chronic problem. The current episode started more than 1 year ago. The problem occurs intermittently. The problem has been waxing and waning. Pertinent negatives include no fever. Associated symptoms comments: reslesssness. Nothing aggravates the symptoms. Treatments tried: effexor. The treatment provided moderate relief.     Review of Systems   Constitutional:  Negative for activity change, appetite change and fever.   Respiratory: Negative.     Cardiovascular: Negative.    Gastrointestinal: Negative.    Musculoskeletal: Negative.    Psychiatric/Behavioral:  Positive for behavioral problems and decreased concentration. The patient is nervous/anxious.          Objective:      Physical Exam  Vitals and nursing note reviewed.   Constitutional:       Appearance: Normal appearance.      Comments: Blind young male , NAD   Cardiovascular:      Rate and Rhythm: Normal rate and regular rhythm.      Pulses: Normal pulses.      Heart sounds: Normal heart sounds. No murmur heard.     No friction rub. No gallop.   Pulmonary:      Effort: Pulmonary effort is normal.      Breath sounds: Normal breath sounds. No wheezing.   Skin:     General: Skin is warm and dry.   Neurological:      Mental Status: He is alert.   Psychiatric:         Mood and Affect: Mood normal.         Assessment:       1. Need for prophylactic vaccination and inoculation against cholera alone  -     (VFC) influenza (Flulaval, Fluzone, Fluarix) 45 mcg/0.5 mL IM vaccine (> or = 6 mo) 0.5 mL    2. Follow-up exam  Overview:  C/o of restlessness    Assessment & Plan:  T/c mental health ref  T/c adding small dose of resperidal      3. Tobacco  abuse  Overview:  He's been a smoker for about 30 years    Assessment & Plan:  He declined ref for tobacco cessation       Other orders  -     rosuvastatin (CRESTOR) 20 MG tablet; Take 1 tablet (20 mg total) by mouth every evening.  Dispense: 90 tablet; Refill: 3           Plan:       1. Need for prophylactic vaccination and inoculation against cholera alone  -     (VFC) influenza (Flulaval, Fluzone, Fluarix) 45 mcg/0.5 mL IM vaccine (> or = 6 mo) 0.5 mL    2. Follow-up exam  Overview:  C/o of restlessness    Assessment & Plan:  T/c mental health ref  T/c adding small dose of resperidal      3. Tobacco abuse  Overview:  He's been a smoker for about 30 years    Assessment & Plan:  He declined ref for tobacco cessation       Other orders  -     rosuvastatin (CRESTOR) 20 MG tablet; Take 1 tablet (20 mg total) by mouth every evening.  Dispense: 90 tablet; Refill: 3

## 2024-10-30 ENCOUNTER — LAB VISIT (OUTPATIENT)
Dept: LAB | Facility: CLINIC | Age: 44
End: 2024-10-30
Payer: MEDICARE

## 2024-10-30 DIAGNOSIS — R73.9 ELEVATED BLOOD SUGAR: ICD-10-CM

## 2024-10-30 LAB
ESTIMATED AVG GLUCOSE: 103 MG/DL (ref 68–131)
HBA1C MFR BLD: 5.2 % (ref 4–5.6)

## 2024-10-30 PROCEDURE — 83036 HEMOGLOBIN GLYCOSYLATED A1C: CPT | Performed by: INTERNAL MEDICINE

## 2024-10-30 PROCEDURE — 36415 COLL VENOUS BLD VENIPUNCTURE: CPT | Mod: ,,, | Performed by: INTERNAL MEDICINE

## 2024-11-04 DIAGNOSIS — F41.9 ANXIETY: ICD-10-CM

## 2024-11-04 RX ORDER — HYDROXYZINE PAMOATE 50 MG/1
50 CAPSULE ORAL EVERY 6 HOURS PRN
Qty: 120 CAPSULE | Refills: 1 | Status: SHIPPED | OUTPATIENT
Start: 2024-11-04 | End: 2025-02-02

## 2024-11-04 NOTE — TELEPHONE ENCOUNTER
Refill Routing Note   Medication(s) are not appropriate for processing by [facility] for the following reason(s):        Outside of protocol    [facility] action(s):  Route             Appointments  past 12m or future 3m with PCP    Date Provider   Last Visit   9/25/2024 Toby Barnard MD   Next Visit   Visit date not found Toby Barnard MD   ED visits in past 90 days: 0        Note composed:4:55 PM 11/04/2024

## 2025-01-07 DIAGNOSIS — F41.9 ANXIETY: ICD-10-CM

## 2025-01-07 RX ORDER — HYDROXYZINE PAMOATE 50 MG/1
50 CAPSULE ORAL EVERY 8 HOURS PRN
Qty: 90 CAPSULE | Refills: 0 | Status: SHIPPED | OUTPATIENT
Start: 2025-01-07 | End: 2025-02-06

## 2025-01-07 NOTE — TELEPHONE ENCOUNTER
Refill Routing Note   Medication(s) are not appropriate for processing by [facility] for the following reason(s):        Outside of protocol    [facility] action(s):  Route               Appointments  past 12m or future 3m with PCP    Date Provider   Last Visit   9/25/2024 Toby Barnard MD   Next Visit   Visit date not found Toby Barnard MD   ED visits in past 90 days: 0        Note composed:12:01 PM 01/07/2025

## 2025-01-15 DIAGNOSIS — G47.00 INSOMNIA, UNSPECIFIED TYPE: ICD-10-CM

## 2025-01-15 RX ORDER — VENLAFAXINE HYDROCHLORIDE 150 MG/1
150 CAPSULE, EXTENDED RELEASE ORAL
Qty: 90 CAPSULE | Refills: 0 | Status: SHIPPED | OUTPATIENT
Start: 2025-01-15

## 2025-01-15 RX ORDER — TRAZODONE HYDROCHLORIDE 150 MG/1
150 TABLET ORAL NIGHTLY
Qty: 90 TABLET | Refills: 2 | Status: SHIPPED | OUTPATIENT
Start: 2025-01-15

## 2025-01-15 NOTE — TELEPHONE ENCOUNTER
Unable to retrieve patient chart and identify care due.  [facility] Embedded Care Due Messages. Reference number: [id number].   1/15/2025 12:12:40 AM CST

## 2025-01-15 NOTE — TELEPHONE ENCOUNTER
Refill Decision Note   Yovany Alcaraz  is requesting a refill authorization.  Brief Assessment and Rationale for Refill:  Approve     Medication Therapy Plan:         Comments:     Note composed:2:14 AM 01/15/2025                Azelaic Acid Counseling: Patient counseled that medicine may cause skin irritation and to avoid applying near the eyes.  In the event of skin irritation, the patient was advised to reduce the amount of the drug applied or use it less frequently.   The patient verbalized understanding of the proper use and possible adverse effects of azelaic acid.  All of the patient's questions and concerns were addressed.

## 2025-02-20 ENCOUNTER — TELEPHONE (OUTPATIENT)
Dept: SMOKING CESSATION | Facility: CLINIC | Age: 45
End: 2025-02-20
Payer: MEDICARE

## 2025-03-18 DIAGNOSIS — G47.00 INSOMNIA, UNSPECIFIED TYPE: ICD-10-CM

## 2025-03-18 RX ORDER — VENLAFAXINE HYDROCHLORIDE 150 MG/1
150 CAPSULE, EXTENDED RELEASE ORAL DAILY
Qty: 90 CAPSULE | Refills: 0 | OUTPATIENT
Start: 2025-03-18

## 2025-03-18 RX ORDER — ROSUVASTATIN CALCIUM 20 MG/1
20 TABLET, COATED ORAL NIGHTLY
Qty: 90 TABLET | Refills: 0 | OUTPATIENT
Start: 2025-03-18

## 2025-03-18 RX ORDER — VENLAFAXINE HYDROCHLORIDE 150 MG/1
150 CAPSULE, EXTENDED RELEASE ORAL DAILY
Qty: 30 CAPSULE | Refills: 0 | Status: SHIPPED | OUTPATIENT
Start: 2025-03-18 | End: 2025-04-17

## 2025-03-18 RX ORDER — TRAZODONE HYDROCHLORIDE 150 MG/1
150 TABLET ORAL NIGHTLY
Qty: 90 TABLET | Refills: 2 | OUTPATIENT
Start: 2025-03-18

## 2025-03-18 NOTE — TELEPHONE ENCOUNTER
Care Due:                  Date            Visit Type   Department     Provider  --------------------------------------------------------------------------------                                EP -                              PRIMARY      [facility] FAMILY  Last Visit: 09-      CARE ([facility])   MEDICINE       Toby Barnard  Next Visit: None Scheduled  None         None Found                                                            Last  Test          Frequency    Reason                     Performed    Due Date  --------------------------------------------------------------------------------    CMP.........  12 months..  rosuvastatin, venlafaxine  03- 03-    Lipid Panel.  12 months..  rosuvastatin.............  03- 03-    [employer] Embedded Care Due Messages. Reference number: [id number].   3/18/2025 7:13:29 AM CDT

## 2025-03-18 NOTE — TELEPHONE ENCOUNTER
No care due was identified.  [facility] Embedded Care Due Messages. Reference number: [id number].   3/18/2025 7:20:03 AM CDT

## 2025-03-18 NOTE — TELEPHONE ENCOUNTER
Refill Routing Note   Medication(s) are not appropriate for processing by [facility] for the following reason(s):        Required labs outdated    [facility] action(s):  Defer        Medication Therapy Plan: FOVS      Appointments  past 12m or future 3m with PCP    Date Provider   Last Visit   9/25/2024 Toby Barnard MD   Next Visit   3/18/2025 Toby Barnard MD   ED visits in past 90 days: 0        Note composed:10:48 AM 03/18/2025

## 2025-03-18 NOTE — TELEPHONE ENCOUNTER
No care due was identified.  [facility] Embedded Care Due Messages. Reference number: [id number].   3/18/2025 7:17:00 AM CDT

## 2025-03-18 NOTE — TELEPHONE ENCOUNTER
Refill Routing Note   Medication(s) are not appropriate for processing by [facility] for the following reason(s):        Required labs outdated    [facility] action(s):  Defer               Appointments  past 12m or future 3m with PCP    Date Provider   Last Visit   9/25/2024 Toby Barnard MD   Next Visit   Visit date not found Toby Barnard MD   ED visits in past 90 days: 0        Note composed:2:36 PM 03/18/2025

## 2025-03-19 NOTE — TELEPHONE ENCOUNTER
Provider Staff:  Please note Refusal of medication.     Action required for this patient.      Requested Prescriptions     Refused Prescriptions Disp Refills    rosuvastatin (CRESTOR) 20 MG tablet 90 tablet 0     Sig: Take 1 tablet (20 mg total) by mouth every evening.     Refused By: GHULAM ALMANZA     Reason for Refusal: Patient needs an appointment      Thanks!  [facility]   Note composed: 03/19/2025 5:20 PM

## 2025-04-11 ENCOUNTER — LAB VISIT (OUTPATIENT)
Dept: LAB | Facility: CLINIC | Age: 45
End: 2025-04-11
Payer: MEDICARE

## 2025-04-11 ENCOUNTER — OFFICE VISIT (OUTPATIENT)
Dept: FAMILY MEDICINE | Facility: CLINIC | Age: 45
End: 2025-04-11
Payer: MEDICARE

## 2025-04-11 ENCOUNTER — RESULTS FOLLOW-UP (OUTPATIENT)
Dept: FAMILY MEDICINE | Facility: CLINIC | Age: 45
End: 2025-04-11

## 2025-04-11 VITALS
HEIGHT: 66 IN | OXYGEN SATURATION: 98 % | DIASTOLIC BLOOD PRESSURE: 66 MMHG | BODY MASS INDEX: 24.09 KG/M2 | HEART RATE: 75 BPM | SYSTOLIC BLOOD PRESSURE: 108 MMHG | WEIGHT: 149.88 LBS

## 2025-04-11 DIAGNOSIS — Z13.1 DIABETES MELLITUS SCREENING: ICD-10-CM

## 2025-04-11 DIAGNOSIS — N40.0 BENIGN PROSTATIC HYPERPLASIA, UNSPECIFIED WHETHER LOWER URINARY TRACT SYMPTOMS PRESENT: ICD-10-CM

## 2025-04-11 DIAGNOSIS — Z00.00 PREVENTATIVE HEALTH CARE: ICD-10-CM

## 2025-04-11 DIAGNOSIS — H54.3 BLINDNESS OF BOTH EYES: ICD-10-CM

## 2025-04-11 DIAGNOSIS — E78.2 MIXED HYPERLIPIDEMIA: ICD-10-CM

## 2025-04-11 DIAGNOSIS — R73.9 BLOOD GLUCOSE ELEVATED: ICD-10-CM

## 2025-04-11 DIAGNOSIS — Z12.12 SCREENING FOR MALIGNANT NEOPLASM OF THE RECTUM: ICD-10-CM

## 2025-04-11 DIAGNOSIS — Z12.5 SPECIAL SCREENING FOR MALIGNANT NEOPLASM OF PROSTATE: Primary | ICD-10-CM

## 2025-04-11 DIAGNOSIS — Z12.5 SPECIAL SCREENING FOR MALIGNANT NEOPLASM OF PROSTATE: ICD-10-CM

## 2025-04-11 DIAGNOSIS — G47.00 INSOMNIA, UNSPECIFIED TYPE: ICD-10-CM

## 2025-04-11 DIAGNOSIS — F32.1 MAJOR DEPRESSIVE DISORDER, SINGLE EPISODE, MODERATE: ICD-10-CM

## 2025-04-11 PROBLEM — N18.31 CHRONIC KIDNEY DISEASE (CKD) STAGE G3A/A1, MODERATELY DECREASED GLOMERULAR FILTRATION RATE (GFR) BETWEEN 45-59 ML/MIN/1.73 SQUARE METER AND ALBUMINURIA CREATININE RATIO LESS THAN 30 MG/G: Status: RESOLVED | Noted: 2023-09-06 | Resolved: 2025-04-11

## 2025-04-11 LAB
ALBUMIN SERPL BCP-MCNC: 4.4 G/DL (ref 3.5–5.2)
ALBUMIN/CREAT UR: 3.9 UG/MG
ALP SERPL-CCNC: 84 UNIT/L (ref 40–150)
ALT SERPL W/O P-5'-P-CCNC: 26 UNIT/L (ref 10–44)
ANION GAP (OHS): 11 MMOL/L (ref 8–16)
AST SERPL-CCNC: 21 UNIT/L (ref 11–45)
BILIRUB SERPL-MCNC: 0.8 MG/DL (ref 0.1–1)
BUN SERPL-MCNC: 17 MG/DL (ref 6–20)
CALCIUM SERPL-MCNC: 9.5 MG/DL (ref 8.7–10.5)
CHLORIDE SERPL-SCNC: 106 MMOL/L (ref 95–110)
CHOLEST SERPL-MCNC: 110 MG/DL (ref 120–199)
CHOLEST/HDLC SERPL: 2.9 {RATIO} (ref 2–5)
CO2 SERPL-SCNC: 25 MMOL/L (ref 23–29)
CREAT SERPL-MCNC: 1.4 MG/DL (ref 0.5–1.4)
CREAT UR-MCNC: 382 MG/DL (ref 23–375)
EAG (OHS): 103 MG/DL (ref 68–131)
GFR SERPLBLD CREATININE-BSD FMLA CKD-EPI: >60 ML/MIN/1.73/M2
GLUCOSE SERPL-MCNC: 107 MG/DL (ref 70–110)
HBA1C MFR BLD: 5.2 % (ref 4–5.6)
HDLC SERPL-MCNC: 38 MG/DL (ref 40–75)
HDLC SERPL: 34.5 % (ref 20–50)
LDLC SERPL CALC-MCNC: 53.2 MG/DL (ref 63–159)
MICROALBUMIN UR-MCNC: 15 UG/ML (ref ?–5000)
NONHDLC SERPL-MCNC: 72 MG/DL
POTASSIUM SERPL-SCNC: 4.1 MMOL/L (ref 3.5–5.1)
PROT SERPL-MCNC: 7.4 GM/DL (ref 6–8.4)
PSA SERPL-MCNC: 0.69 NG/ML
SODIUM SERPL-SCNC: 142 MMOL/L (ref 136–145)
TRIGL SERPL-MCNC: 94 MG/DL (ref 30–150)

## 2025-04-11 PROCEDURE — 82570 ASSAY OF URINE CREATININE: CPT | Performed by: INTERNAL MEDICINE

## 2025-04-11 PROCEDURE — 84153 ASSAY OF PSA TOTAL: CPT

## 2025-04-11 PROCEDURE — 36415 COLL VENOUS BLD VENIPUNCTURE: CPT | Mod: ,,, | Performed by: INTERNAL MEDICINE

## 2025-04-11 PROCEDURE — 80053 COMPREHEN METABOLIC PANEL: CPT

## 2025-04-11 PROCEDURE — 80061 LIPID PANEL: CPT

## 2025-04-11 PROCEDURE — 83036 HEMOGLOBIN GLYCOSYLATED A1C: CPT

## 2025-04-11 RX ORDER — TRAZODONE HYDROCHLORIDE 150 MG/1
150 TABLET ORAL NIGHTLY
Qty: 90 TABLET | Refills: 1 | Status: SHIPPED | OUTPATIENT
Start: 2025-04-11 | End: 2025-10-08

## 2025-04-11 RX ORDER — HYDROXYZINE PAMOATE 50 MG/1
50 CAPSULE ORAL 3 TIMES DAILY
COMMUNITY
End: 2025-04-11 | Stop reason: SDUPTHER

## 2025-04-11 RX ORDER — ROSUVASTATIN CALCIUM 20 MG/1
20 TABLET, COATED ORAL NIGHTLY
Qty: 90 TABLET | Refills: 1 | Status: SHIPPED | OUTPATIENT
Start: 2025-04-11 | End: 2025-10-08

## 2025-04-11 RX ORDER — VALACYCLOVIR HYDROCHLORIDE 500 MG/1
500 TABLET, FILM COATED ORAL 2 TIMES DAILY
COMMUNITY
Start: 2025-03-07

## 2025-04-11 RX ORDER — HYDROXYZINE PAMOATE 50 MG/1
50 CAPSULE ORAL 3 TIMES DAILY
Qty: 90 CAPSULE | Refills: 2 | Status: SHIPPED | OUTPATIENT
Start: 2025-04-11 | End: 2025-07-10

## 2025-04-11 RX ORDER — VENLAFAXINE HYDROCHLORIDE 150 MG/1
150 CAPSULE, EXTENDED RELEASE ORAL DAILY
Qty: 90 CAPSULE | Refills: 1 | Status: SHIPPED | OUTPATIENT
Start: 2025-04-11 | End: 2025-10-08

## 2025-04-11 NOTE — PROGRESS NOTES
Subjective:       Patient ID: Yovany Alcaraz is a 44 y.o. male.    Chief Complaint: Follow-up (Routine follow up/)      History of Present Illness    CHIEF COMPLAINT:  Yovany presents for medication refills and to discuss anxiety management.    HPI:  Yovany has ongoing anxiety issues. He takes Effexor (venlafaxine) and hydroxyzine for anxiety management but still has difficulty relaxing. His caregiver reports he is unable to sit still for 15-20 minutes at a time. He has been trying to manage without some of his medications due to recently resolved insurance coverage issues. He has been walking around the neighborhood to help with his symptoms. He also mentions having back pain and generalized body discomfort.    He denies any problems with urination, burning sensations, or pain in the feet.    MEDICATIONS:  Yovany is on Crestor, which he takes in the evening. He is also on Effexor and Hydroxyzine for anxiety. He takes Valtrex for occasional flare-ups, possibly related to shingles.    MEDICAL HISTORY:  Yovany has a history of being a disabled adult.         Review of Systems   Constitutional:  Negative for activity change, appetite change and fever.   Respiratory: Negative.     Cardiovascular: Negative.    Gastrointestinal: Negative.    Musculoskeletal: Negative.    Psychiatric/Behavioral:  The patient is nervous/anxious.          Objective:      Physical Exam  Vitals and nursing note reviewed.   Constitutional:       Appearance: Normal appearance.      Comments: Totally blind, uses a stick , accompanied by MomNathalia   Cardiovascular:      Rate and Rhythm: Normal rate and regular rhythm.      Pulses: Normal pulses.      Heart sounds: Normal heart sounds. No murmur heard.     No friction rub. No gallop.   Pulmonary:      Effort: Pulmonary effort is normal.      Breath sounds: Normal breath sounds. No wheezing.   Skin:     General: Skin is warm and dry.   Neurological:      Mental Status: He is alert.         Assessment:        1. Special screening for malignant neoplasm of prostate  -     Prostate Specific Antigen, Diagnostic; Future; Expected date: 04/11/2025    2. Insomnia, unspecified type  -     traZODone (DESYREL) 150 MG tablet; Take 1 tablet (150 mg total) by mouth every evening.  Dispense: 90 tablet; Refill: 1    3. Mixed hyperlipidemia  Overview:  Improved in the past    Assessment & Plan:  Refill crestor  Recheck today    Orders:  -     Microalbumin/Creatinine Ratio, Urine; Future; Expected date: 04/11/2025  -     Comprehensive Metabolic Panel; Future; Expected date: 04/11/2025  -     Lipid Panel; Future; Expected date: 04/11/2025    4. Diabetes mellitus screening  -     Hemoglobin A1C; Future; Expected date: 04/11/2025    5. Blood glucose elevated  -     Hemoglobin A1C; Future; Expected date: 04/11/2025    6. Benign prostatic hyperplasia, unspecified whether lower urinary tract symptoms present  -     Prostate Specific Antigen, Diagnostic; Future; Expected date: 04/11/2025    7. Screening for malignant neoplasm of the rectum  -     Fecal Immunochemical Test (iFOBT); Future; Expected date: 04/11/2025    8. Major depressive disorder, single episode, moderate  Overview:  stable    Assessment & Plan:  Refill effexor  Patient & Mom ref to [facility] for further Rx and eval if needed      9. Blindness of both eyes  Overview:  No new changes    Assessment & Plan:  He has good family support  Monitor      10. Preventative health care  Overview:  See below    Assessment & Plan:  Fitkit supplied today  I gave the patient verbal / written recommendations re the outstanding vaccinations.  Get annual UACr  Get lipids        Other orders  -     rosuvastatin (CRESTOR) 20 MG tablet; Take 1 tablet (20 mg total) by mouth every evening.  Dispense: 90 tablet; Refill: 1  -     venlafaxine (EFFEXOR-XR) 150 MG Cp24; Take 1 capsule (150 mg total) by mouth once daily.  Dispense: 90 capsule; Refill: 1  -     VISTARIL 50 mg Cap; Take 1  capsule (50 mg total) by mouth 3 (three) times daily.  Dispense: 90 capsule; Refill: 2           Plan:       Assessment & Plan    F32.1 Major depressive disorder, single episode, moderate  F41.1 Generalized anxiety disorder  M54.9 Dorsalgia, unspecified  B02.9 Zoster without complications  E78.5 Hyperlipidemia, unspecified    IMPRESSION:  - Reviewed current medications.  - Considered ongoing anxiety symptoms and need for further mental health evaluation.  - Noted use of Valtrex for occasional skin flare-ups, prescribed by dermatologist Dr. Turner.  - Assessed urinary function and foot health, finding no concerning symptoms.    MAJOR DEPRESSIVE DISORDER, SINGLE EPISODE, MODERATE:  - Continue Effexor 150 mg for 6 months.  - Due to ongoing anxiety issues despite current medication, prescribed hydroxyzine for anxiety management.  - Referred the patient to [facility] for further evaluation and treatment.    GENERALIZED ANXIETY DISORDER:  - Continue hydroxyzine for 6 months.    DORSALGIA:  - Encouraged the patient to continue walking in the neighborhood as a form of exercise for managing dorsalgia.    SHINGLES:  - Continue Valtrex, prescribed by dermatologist Dr. Turner, for managing occasional shingles flare-ups.    HYPERLIPIDEMIA:  - Continue Crestor for 6 months to manage hyperlipidemia.       Yovany was seen today for follow-up.    Diagnoses and all orders for this visit:    Special screening for malignant neoplasm of prostate  -     Prostate Specific Antigen, Diagnostic; Future    Insomnia, unspecified type  -     traZODone (DESYREL) 150 MG tablet; Take 1 tablet (150 mg total) by mouth every evening.    Mixed hyperlipidemia  -     Microalbumin/Creatinine Ratio, Urine; Future  -     Comprehensive Metabolic Panel; Future  -     Lipid Panel; Future    Diabetes mellitus screening  -     Hemoglobin A1C; Future    Blood glucose elevated  -     Hemoglobin A1C; Future    Benign prostatic hyperplasia,  unspecified whether lower urinary tract symptoms present  -     Prostate Specific Antigen, Diagnostic; Future    Screening for malignant neoplasm of the rectum  -     Fecal Immunochemical Test (iFOBT); Future    Major depressive disorder, single episode, moderate    Blindness of both eyes    Preventative health care    Other orders  -     rosuvastatin (CRESTOR) 20 MG tablet; Take 1 tablet (20 mg total) by mouth every evening.  -     venlafaxine (EFFEXOR-XR) 150 MG Cp24; Take 1 capsule (150 mg total) by mouth once daily.  -     VISTARIL 50 mg Cap; Take 1 capsule (50 mg total) by mouth 3 (three) times daily.            Visit today included increased complexity associated with the care of the episodic problem.   I addressed and managing the longitudinal care of the patient due to the serious and/or complex managed problem(s).  .

## 2025-04-11 NOTE — ASSESSMENT & PLAN NOTE
Fitkit supplied today  I gave the patient verbal / written recommendations re the outstanding vaccinations.  Get annual UACr  Get lipids

## 2025-04-25 ENCOUNTER — PATIENT MESSAGE (OUTPATIENT)
Dept: ADMINISTRATIVE | Facility: HOSPITAL | Age: 45
End: 2025-04-25
Payer: MEDICARE

## 2025-06-18 ENCOUNTER — TELEPHONE (OUTPATIENT)
Dept: FAMILY MEDICINE | Facility: CLINIC | Age: 45
End: 2025-06-18
Payer: MEDICARE

## 2025-06-18 NOTE — TELEPHONE ENCOUNTER
1st attempt  Called to reschedule patient's upcoming appointment, as Dr. Bosch is no longer with our practice. There was no answer, so a voicemail was left requesting a call back to reschedule with another provider.

## 2025-06-18 NOTE — TELEPHONE ENCOUNTER
2nd Attempt   Called to reschedule patient's upcoming appointment, as Dr. Bosch is no longer with our practice. There was no answer, so a voicemail was left requesting a call back to reschedule with another provider.

## 2025-07-11 ENCOUNTER — OFFICE VISIT (OUTPATIENT)
Dept: FAMILY MEDICINE | Facility: CLINIC | Age: 45
End: 2025-07-11
Payer: MEDICARE

## 2025-07-11 VITALS
SYSTOLIC BLOOD PRESSURE: 104 MMHG | HEART RATE: 71 BPM | RESPIRATION RATE: 18 BRPM | HEIGHT: 66 IN | WEIGHT: 144.31 LBS | OXYGEN SATURATION: 99 % | BODY MASS INDEX: 23.19 KG/M2 | DIASTOLIC BLOOD PRESSURE: 70 MMHG

## 2025-07-11 DIAGNOSIS — G47.00 INSOMNIA, UNSPECIFIED TYPE: Primary | ICD-10-CM

## 2025-07-11 DIAGNOSIS — E78.5 DYSLIPIDEMIA: ICD-10-CM

## 2025-07-11 DIAGNOSIS — F41.1 GAD (GENERALIZED ANXIETY DISORDER): ICD-10-CM

## 2025-07-11 DIAGNOSIS — B00.9 HSV (HERPES SIMPLEX VIRUS) INFECTION: ICD-10-CM

## 2025-07-11 DIAGNOSIS — Z87.891 PERSONAL HISTORY OF TOBACCO USE: ICD-10-CM

## 2025-07-11 DIAGNOSIS — Z12.11 COLON CANCER SCREENING: ICD-10-CM

## 2025-07-11 PROBLEM — F32.4 MAJOR DEPRESSIVE DISORDER WITH SINGLE EPISODE, IN PARTIAL REMISSION: Status: RESOLVED | Noted: 2024-06-25 | Resolved: 2025-07-11

## 2025-07-11 PROBLEM — B02.9 SHINGLES: Status: RESOLVED | Noted: 2023-09-06 | Resolved: 2025-07-11

## 2025-07-11 PROBLEM — F32.1 MAJOR DEPRESSIVE DISORDER, SINGLE EPISODE, MODERATE: Status: RESOLVED | Noted: 2024-02-16 | Resolved: 2025-07-11

## 2025-07-11 PROBLEM — E78.2 MIXED HYPERLIPIDEMIA: Status: RESOLVED | Noted: 2023-09-06 | Resolved: 2025-07-11

## 2025-07-11 PROBLEM — F33.9 DEPRESSION, RECURRENT: Status: RESOLVED | Noted: 2023-08-09 | Resolved: 2025-07-11

## 2025-07-11 PROBLEM — Z00.00 PREVENTATIVE HEALTH CARE: Status: RESOLVED | Noted: 2023-08-09 | Resolved: 2025-07-11

## 2025-07-11 PROBLEM — F41.9 ANXIETY: Status: RESOLVED | Noted: 2023-08-09 | Resolved: 2025-07-11

## 2025-07-11 PROBLEM — F17.200 TOBACCO USE DISORDER: Status: RESOLVED | Noted: 2024-02-16 | Resolved: 2025-07-11

## 2025-07-11 PROBLEM — H54.3 BLINDNESS OF BOTH EYES: Status: RESOLVED | Noted: 2025-04-11 | Resolved: 2025-07-11

## 2025-07-11 PROBLEM — R21 RASH OF BACK: Status: RESOLVED | Noted: 2023-09-06 | Resolved: 2025-07-11

## 2025-07-11 PROBLEM — H93.233 HEARING ABNORMALLY ACUTE, BILATERAL: Status: RESOLVED | Noted: 2024-03-21 | Resolved: 2025-07-11

## 2025-07-11 RX ORDER — ROSUVASTATIN CALCIUM 20 MG/1
20 TABLET, COATED ORAL NIGHTLY
Qty: 90 TABLET | Refills: 1 | Status: SHIPPED | OUTPATIENT
Start: 2025-07-11 | End: 2025-07-11

## 2025-07-11 RX ORDER — ROSUVASTATIN CALCIUM 20 MG/1
20 TABLET, COATED ORAL NIGHTLY
Qty: 90 TABLET | Refills: 3 | Status: SHIPPED | OUTPATIENT
Start: 2025-07-11

## 2025-07-11 RX ORDER — HYDROXYZINE PAMOATE 50 MG/1
50 CAPSULE ORAL 3 TIMES DAILY
Qty: 90 CAPSULE | Refills: 2 | Status: SHIPPED | OUTPATIENT
Start: 2025-07-11 | End: 2025-10-09

## 2025-07-11 RX ORDER — VENLAFAXINE HYDROCHLORIDE 150 MG/1
150 CAPSULE, EXTENDED RELEASE ORAL DAILY
Qty: 90 CAPSULE | Refills: 1 | Status: SHIPPED | OUTPATIENT
Start: 2025-07-11 | End: 2026-01-07

## 2025-07-11 RX ORDER — TRAZODONE HYDROCHLORIDE 150 MG/1
150 TABLET ORAL NIGHTLY
Qty: 90 TABLET | Refills: 1 | Status: SHIPPED | OUTPATIENT
Start: 2025-07-11 | End: 2026-01-07

## 2025-07-11 NOTE — PATIENT INSTRUCTIONS
Zack Pedroza,     If you are due for any health screening(s) below please notify me so we can arrange them to be ordered and scheduled. Most healthy patients at your age complete them, but you are free to accept or refuse.     If you can't do it, I'll definitely understand. If you can, I'd certainly appreciate it!    Tests to Keep You Healthy    Colon Cancer Screening: ORDERED      Its time for your colon cancer screening     Colorectal cancer is one of the leading causes of cancer death for men and women but it doesnt have to be. Screenings can prevent colorectal cancer or find it early enough to treat and cure the disease.     Our records indicate that you may be overdue for colon cancer screening. A colonoscopy or stool screening test can help identify patients at risk for developing colon cancer. Cancer screenings save lives, so schedule yours today to stay healthy.     A colonoscopy is the preferred test for detecting colon cancer. It is needed only once every 10 years if results are negative. While you are sedated, a flexible, lighted tube with a tiny camera is inserted into the rectum and advanced through the colon to look for cancers.     An alternative screening test that is used at home and returned to the lab may also be used. It detects hidden blood in bowel movements which could indicate cancer in the colon. If results are positive, you will need a colonoscopy to determine if the blood is a sign of cancer. This type of follow up (diagnostic) colonoscopy usually requires additional copays as required by your insurance provider.     If you recently had your colon cancer screening performed outside of [facility], please let your Health care team know so that they can update your health record. Please contact your PCP if you have any questions.

## 2025-07-11 NOTE — PROGRESS NOTES
"  [facility] - [place], [place]    Family Medicine Office Visit    Subjective     Patient ID: Yovany Alcaraz is a 44 y.o. male who presents to clinic today to establish care.     Medical history, surgical history, medications, allergies, and social history were reviewed and updated.     Chief Complaint: Establish Care    HPI    Establish care  He presents today to establish care. We have reviewed medical history, surgical history, family history, medications, allergies, and social history. EMR was updated appropriately.     Insomnia  History of insomnia that is currently well-controlled with trazodone 150 mg nightly.  No new or worsening symptoms reported.  Requesting refill today.    EVIE  Has a history of anxiety that is currently well-controlled with the Effexor 150 mg daily and Vistaril 50 mg 3 times daily.  No new or worsening symptoms reported.  No suicidal or homicidal ideation reported.  Requesting refill today.    HSV  Has a history of cold sores.  Reported this has currently well-controlled with Valtrex as needed.  Reports that he does not need refills at this time in his plenty of this medication.    Dyslipidemia  History of dyslipidemia with current medication regimen of Crestor 20 mg nightly.  Did not report muscle cramping or aches. Most recent lipid panel shown below.     Lab Results   Component Value Date    CHOL 110 (L) 04/11/2025    CHOL 126 03/14/2024    CHOL 271 (H) 08/10/2023     Lab Results   Component Value Date    HDL 38 (L) 04/11/2025    HDL 36 (L) 03/14/2024    HDL 40 08/10/2023     Lab Results   Component Value Date    LDLCALC 53.2 (L) 04/11/2025    LDLCALC 71.6 03/14/2024    LDLCALC 171.2 (H) 08/10/2023     No results found for: "DLDL"  Lab Results   Component Value Date    TRIG 94 04/11/2025    TRIG 92 03/14/2024    TRIG 299 (H) 08/10/2023       f1   Lab Results   Component Value Date    CHOLHDL 34.5 04/11/2025    CHOLHDL 28.6 03/14/2024    CHOLHDL 14.8 (L) " 08/10/2023        Vitals:    07/11/25 1003   BP: 104/70   Pulse: 71   Resp: 18      Wt Readings from Last 3 Encounters:   07/11/25 1003 65.5 kg (144 lb 4.7 oz)   04/11/25 1054 68 kg (149 lb 14.4 oz)   09/25/24 1416 68.2 kg (150 lb 6.4 oz)      Review of Systems    Review of Systems otherwise negative unless specified above.        Objective     Physical Exam  Vitals reviewed.   Constitutional:       General: He is not in acute distress.     Appearance: Normal appearance.      Comments: Blind and uses a white cane for walking.  Present with aunt today as .   HENT:      Head: Normocephalic and atraumatic.      Nose: Nose normal.      Mouth/Throat:      Mouth: Mucous membranes are moist.   Cardiovascular:      Rate and Rhythm: Normal rate and regular rhythm.      Heart sounds: No murmur heard.  Pulmonary:      Effort: Pulmonary effort is normal. No respiratory distress.      Breath sounds: Normal breath sounds.   Musculoskeletal:         General: Normal range of motion.   Skin:     General: Skin is warm and dry.   Neurological:      General: No focal deficit present.      Mental Status: He is alert and oriented to person, place, and time.   Psychiatric:         Mood and Affect: Mood normal.         Behavior: Behavior normal.            Assessment and Plan     Current Outpatient Medications   Medication Instructions    rosuvastatin (CRESTOR) 20 mg, Oral, Nightly    traZODone (DESYREL) 150 mg, Oral, Nightly    valACYclovir (VALTREX) 500 mg, 2 times daily    venlafaxine (EFFEXOR-XR) 150 mg, Oral, Daily    VistariL 50 mg, Oral, 3 times daily        1. Insomnia, unspecified type  -     traZODone (DESYREL) 150 MG tablet; Take 1 tablet (150 mg total) by mouth every evening.  Dispense: 90 tablet; Refill: 1    2. EVIE (generalized anxiety disorder)  -     venlafaxine (EFFEXOR-XR) 150 MG Cp24; Take 1 capsule (150 mg total) by mouth once daily.  Dispense: 90 capsule; Refill: 1  -     VISTARIL 50 mg Cap; Take 1 capsule (50 mg  total) by mouth 3 (three) times daily.  Dispense: 90 capsule; Refill: 2    3. HSV (herpes simplex virus) infection    4. Dyslipidemia  -     rosuvastatin (CRESTOR) 20 MG tablet; Take 1 tablet (20 mg total) by mouth every evening.  Dispense: 90 tablet; Refill: 1    5. Personal history of tobacco use    6. Colon cancer screening  -     Cologuard Screening (Multitarget Stool DNA); Future; Expected date: 07/11/2025        Refilling chronic medications as above.  They were requesting Cologuard for colon cancer screening.  Reported that they will not be able to complete fit kit.  We did discuss that this test is not as effective as colonoscopy.  They reported that they would like to complete the Cologuard 1st and should he develop symptoms or this be positive we will move forward with colonoscopy at that point.  We will otherwise plan to have her follow up in 6 months or sooner if needed.    Visit today included increased complexity associated with the care of the episodic problem insomnia addressed and managing the longitudinal care of the patient due to the serious and/or complex managed problem(s) and found.         Follow up in about 6 months (around 1/11/2026) for Follow up with Lenny.    Questions were invited and answered. No other acute concerns at this time. Will plan to follow up as above or sooner if needed.     Karli Galvez, DO  07/11/2025 1:08 PM       This dictation has been generated using Modal Fluency Dictation. Some phonetic errors may occur. Please contact author for clarification if needed.

## 2025-07-24 ENCOUNTER — PATIENT MESSAGE (OUTPATIENT)
Dept: ADMINISTRATIVE | Facility: HOSPITAL | Age: 45
End: 2025-07-24
Payer: MEDICARE